# Patient Record
Sex: MALE | Race: WHITE | NOT HISPANIC OR LATINO | Employment: FULL TIME | URBAN - METROPOLITAN AREA
[De-identification: names, ages, dates, MRNs, and addresses within clinical notes are randomized per-mention and may not be internally consistent; named-entity substitution may affect disease eponyms.]

---

## 2017-03-02 ENCOUNTER — ALLSCRIPTS OFFICE VISIT (OUTPATIENT)
Dept: OTHER | Facility: OTHER | Age: 47
End: 2017-03-02

## 2017-03-02 LAB — HBA1C MFR BLD HPLC: 5.1 %

## 2017-06-01 ENCOUNTER — ALLSCRIPTS OFFICE VISIT (OUTPATIENT)
Dept: OTHER | Facility: OTHER | Age: 47
End: 2017-06-01

## 2017-06-01 LAB — HBA1C MFR BLD HPLC: 5.3 %

## 2017-10-03 ENCOUNTER — OFFICE VISIT (OUTPATIENT)
Dept: LAB | Facility: HOSPITAL | Age: 47
End: 2017-10-03
Attending: SPECIALIST
Payer: COMMERCIAL

## 2017-10-03 ENCOUNTER — APPOINTMENT (OUTPATIENT)
Dept: PREADMISSION TESTING | Facility: HOSPITAL | Age: 47
End: 2017-10-03
Payer: COMMERCIAL

## 2017-10-03 ENCOUNTER — APPOINTMENT (OUTPATIENT)
Dept: LAB | Facility: HOSPITAL | Age: 47
End: 2017-10-03
Attending: SPECIALIST
Payer: COMMERCIAL

## 2017-10-03 ENCOUNTER — TRANSCRIBE ORDERS (OUTPATIENT)
Dept: ADMINISTRATIVE | Facility: HOSPITAL | Age: 47
End: 2017-10-03

## 2017-10-03 VITALS — HEIGHT: 76 IN | WEIGHT: 315 LBS | BODY MASS INDEX: 38.36 KG/M2

## 2017-10-03 DIAGNOSIS — Z01.818 PREOP EXAMINATION: Primary | ICD-10-CM

## 2017-10-03 DIAGNOSIS — Z01.818 PREOP EXAMINATION: ICD-10-CM

## 2017-10-03 LAB
ANION GAP SERPL CALCULATED.3IONS-SCNC: 10 MMOL/L (ref 4–13)
BUN SERPL-MCNC: 11 MG/DL (ref 5–25)
CALCIUM SERPL-MCNC: 9.5 MG/DL (ref 8.3–10.1)
CHLORIDE SERPL-SCNC: 104 MMOL/L (ref 100–108)
CO2 SERPL-SCNC: 27 MMOL/L (ref 21–32)
CREAT SERPL-MCNC: 0.93 MG/DL (ref 0.6–1.3)
GFR SERPL CREATININE-BSD FRML MDRD: 99 ML/MIN/1.73SQ M
GLUCOSE P FAST SERPL-MCNC: 114 MG/DL (ref 65–99)
HCT VFR BLD AUTO: 40.7 % (ref 42–52)
HGB BLD-MCNC: 13.9 G/DL (ref 14–18)
POTASSIUM SERPL-SCNC: 4 MMOL/L (ref 3.5–5.3)
SODIUM SERPL-SCNC: 141 MMOL/L (ref 136–145)

## 2017-10-03 PROCEDURE — 80048 BASIC METABOLIC PNL TOTAL CA: CPT

## 2017-10-03 PROCEDURE — 93005 ELECTROCARDIOGRAM TRACING: CPT

## 2017-10-03 PROCEDURE — 85014 HEMATOCRIT: CPT

## 2017-10-03 PROCEDURE — 85018 HEMOGLOBIN: CPT

## 2017-10-03 PROCEDURE — 36415 COLL VENOUS BLD VENIPUNCTURE: CPT

## 2017-10-03 RX ORDER — ROSUVASTATIN CALCIUM 20 MG/1
20 TABLET, COATED ORAL DAILY
COMMUNITY
End: 2018-02-09

## 2017-10-03 RX ORDER — MULTIVIT WITH MINERALS/LUTEIN
1000 TABLET ORAL DAILY
COMMUNITY
End: 2018-02-08

## 2017-10-03 RX ORDER — AMLODIPINE AND VALSARTAN 5; 160 MG/1; MG/1
1 TABLET ORAL DAILY
COMMUNITY
End: 2021-11-22

## 2017-10-03 RX ORDER — OMEGA-3S/DHA/EPA/FISH OIL/D3 300MG-1000
CAPSULE ORAL EVERY MORNING
COMMUNITY
End: 2018-02-08

## 2017-10-03 RX ORDER — NEBIVOLOL 20 MG/1
20 TABLET ORAL DAILY
COMMUNITY
End: 2018-02-09

## 2017-10-03 NOTE — PRE-PROCEDURE INSTRUCTIONS
My Surgical Experience    The following information was developed to assist you to prepare for your operation  What do I need to do before coming to the hospital?   Arrange for a responsible person to drive you to and from the hospital    Arrange care for your children at home  Children are not allowed in the recovery areas of the hospital   Plan to wear clothing that is easy to put on and take off  If you are having shoulder surgery, wear a shirt that buttons or zippers in the front  Bathing  o Shower the evening before and the morning of your surgery with an antibacterial soap  Please refer to the Pre Op Showering Instructions for Surgery Patients Sheet   o Remove nail polish and all body piercing jewelry  o Do not shave any body part for at least 24 hours before surgery-this includes face, arms, legs and upper body  Food  o Nothing to eat or drink after midnight the night before your surgery  This includes candy and chewing gum  o Exception: If your surgery is after 12:00pm (noon), you may have clear liquids such as 7-Up®, ginger ale, apple or cranberry juice, Jell-O®, water, or clear broth until 8:00 am  o Do not drink milk or juice with pulp on the morning before surgery  o Do not drink alcohol 24 hours before surgery  Medicine  o Follow instructions you received from your surgeon about which medicines you may take on the day of surgery  o If instructed to take medicine on the morning of surgery, take pills with just a small sip of water  Call your prescribing doctor for specific infroamtion on what to do if you take insulin    What should I bring to the hospital?    Bring:  Letty Gupta or a walker, if you have them, for foot or knee surgery   A list of the daily medicines, vitamins, minerals, herbals and nutritional supplements you take   Include the dosages of medicines and the time you take them each day   Glasses, dentures or hearing aids   Minimal clothing; you will be wearing hospital sleepwear   Photo ID; required to verify your identity   If you have a Living Will or Power of , bring a copy of the documents   If you have an ostomy, bring an extra pouch and any supplies you use    Do not bring   Medicines or inhalers   Money, valuables or jewelry    What other information should I know about the day of surgery?  Notify your surgeons if you develop a cold, sore throat, cough, fever, rash or any other illness   Report to the Ambulatory Surgical/Same Day Surgery Unit   You will be instructed to stop at Registration only if you have not been pre-registered   Inform your  fi they do not stay that they will be asked by the staff to leave a phone number where they can be reached   Be available to be reached before surgery  In the event the operating room schedule changes, you may be asked to come in earlier or later than expected    *It is important to tell your doctor and others involved in your health care if you are taking or have been taking any non-prescription drugs, vitamins, minerals, herbals or other nutritional supplements  Any of these may interact with some food or medicines and cause a reaction      Pre-Surgery Instructions:   Medication Instructions    amLODIPine-valsartan (EXFORGE) 5-160 MG per tablet Instructed patient per Anesthesia Guidelines   Ascorbic Acid (VITAMIN C) 1000 MG tablet Instructed patient per Anesthesia Guidelines   choline fenofibrate (TRILIPIX) 135 MG capsule Instructed patient per Anesthesia Guidelines   cyanocobalamin 1000 MCG tablet Instructed patient per Anesthesia Guidelines   Multiple Vitamins-Minerals (CENTRUM ADULTS PO) Instructed patient per Anesthesia Guidelines   nebivolol (BYSTOLIC) 20 MG tablet Instructed patient per Anesthesia Guidelines   Omega-3 Fatty Acids (FISH OIL BURP-LESS) 1000 MG CAPS Instructed patient per Anesthesia Guidelines      rosuvastatin (CRESTOR) 20 MG tablet Instructed patient per Anesthesia Guidelines  To take exforge and bystolic a m   Of surgery

## 2017-10-05 LAB
ATRIAL RATE: 62 BPM
P AXIS: 2 DEGREES
PR INTERVAL: 156 MS
QRS AXIS: -2 DEGREES
QRSD INTERVAL: 88 MS
QT INTERVAL: 426 MS
QTC INTERVAL: 432 MS
T WAVE AXIS: 14 DEGREES
VENTRICULAR RATE: 62 BPM

## 2017-10-08 ENCOUNTER — ANESTHESIA EVENT (OUTPATIENT)
Dept: PERIOP | Facility: HOSPITAL | Age: 47
End: 2017-10-08
Payer: COMMERCIAL

## 2017-10-08 NOTE — ANESTHESIA PREPROCEDURE EVALUATION
Review of Systems/Medical History  Patient summary reviewed  Chart reviewed  No history of anesthetic complications     Cardiovascular  Hyperlipidemia, Hypertension on > 1 medication,    Pulmonary       GI/Hepatic            Endo/Other     GYN       Hematology   Musculoskeletal  Obesity ,        Neurology   Psychology           Physical Exam    Airway    Mallampati score: II  TM Distance: <3 FB  Neck ROM: full     Dental       Cardiovascular  Rhythm: regular, Rate: normal,     Pulmonary  Breath sounds clear to auscultation,     Other Findings        Anesthesia Plan  ASA Score- 2       Anesthesia Type- general  Comment: GA with ETT, possible LMA      Induction- intravenous      Informed Consent  Anesthetic plan and risks discussed with patient

## 2017-10-09 ENCOUNTER — ANESTHESIA (OUTPATIENT)
Dept: PERIOP | Facility: HOSPITAL | Age: 47
End: 2017-10-09
Payer: COMMERCIAL

## 2017-10-09 ENCOUNTER — HOSPITAL ENCOUNTER (OUTPATIENT)
Facility: HOSPITAL | Age: 47
Setting detail: OUTPATIENT SURGERY
Discharge: HOME | End: 2017-10-09
Attending: SPECIALIST | Admitting: SPECIALIST
Payer: COMMERCIAL

## 2017-10-09 VITALS
RESPIRATION RATE: 18 BRPM | SYSTOLIC BLOOD PRESSURE: 148 MMHG | TEMPERATURE: 98.2 F | HEART RATE: 68 BPM | DIASTOLIC BLOOD PRESSURE: 74 MMHG | OXYGEN SATURATION: 95 %

## 2017-10-09 PROBLEM — K43.9 VENTRAL HERNIA WITHOUT OBSTRUCTION OR GANGRENE: Status: ACTIVE | Noted: 2017-10-09

## 2017-10-09 PROBLEM — E66.01 MORBID OBESITY DUE TO EXCESS CALORIES (HCC): Status: ACTIVE | Noted: 2017-10-09

## 2017-10-09 PROCEDURE — C1781 MESH (IMPLANTABLE): HCPCS | Performed by: SPECIALIST

## 2017-10-09 DEVICE — VENTRALEX HERNIA PATCH, 6.4 CM (2.5"), MEDIUM CIRCLE WITH STRAP
Type: IMPLANTABLE DEVICE | Site: ABDOMEN | Status: FUNCTIONAL
Brand: VENTRALEX

## 2017-10-09 RX ORDER — ONDANSETRON 2 MG/ML
4 INJECTION INTRAMUSCULAR; INTRAVENOUS ONCE AS NEEDED
Status: DISCONTINUED | OUTPATIENT
Start: 2017-10-09 | End: 2017-10-09 | Stop reason: HOSPADM

## 2017-10-09 RX ORDER — ONDANSETRON 2 MG/ML
INJECTION INTRAMUSCULAR; INTRAVENOUS AS NEEDED
Status: DISCONTINUED | OUTPATIENT
Start: 2017-10-09 | End: 2017-10-09 | Stop reason: SURG

## 2017-10-09 RX ORDER — OXYCODONE HYDROCHLORIDE AND ACETAMINOPHEN 5; 325 MG/1; MG/1
1 TABLET ORAL EVERY 4 HOURS PRN
Qty: 20 TABLET | Refills: 0 | Status: SHIPPED | OUTPATIENT
Start: 2017-10-09 | End: 2017-10-14

## 2017-10-09 RX ORDER — LIDOCAINE HYDROCHLORIDE 10 MG/ML
INJECTION, SOLUTION INFILTRATION; PERINEURAL AS NEEDED
Status: DISCONTINUED | OUTPATIENT
Start: 2017-10-09 | End: 2017-10-09 | Stop reason: SURG

## 2017-10-09 RX ORDER — SODIUM CHLORIDE, SODIUM LACTATE, POTASSIUM CHLORIDE, CALCIUM CHLORIDE 600; 310; 30; 20 MG/100ML; MG/100ML; MG/100ML; MG/100ML
75 INJECTION, SOLUTION INTRAVENOUS CONTINUOUS
Status: DISCONTINUED | OUTPATIENT
Start: 2017-10-09 | End: 2017-10-09 | Stop reason: HOSPADM

## 2017-10-09 RX ORDER — HYDROMORPHONE HCL 110MG/55ML
0.5 PATIENT CONTROLLED ANALGESIA SYRINGE INTRAVENOUS AS NEEDED
Status: DISCONTINUED | OUTPATIENT
Start: 2017-10-09 | End: 2017-10-09 | Stop reason: HOSPADM

## 2017-10-09 RX ORDER — MIDAZOLAM HYDROCHLORIDE 1 MG/ML
INJECTION INTRAMUSCULAR; INTRAVENOUS AS NEEDED
Status: DISCONTINUED | OUTPATIENT
Start: 2017-10-09 | End: 2017-10-09 | Stop reason: SURG

## 2017-10-09 RX ORDER — PROPOFOL 10 MG/ML
INJECTION, EMULSION INTRAVENOUS AS NEEDED
Status: DISCONTINUED | OUTPATIENT
Start: 2017-10-09 | End: 2017-10-09 | Stop reason: SURG

## 2017-10-09 RX ORDER — BUPIVACAINE HYDROCHLORIDE AND EPINEPHRINE 5; 5 MG/ML; UG/ML
INJECTION, SOLUTION PERINEURAL AS NEEDED
Status: DISCONTINUED | OUTPATIENT
Start: 2017-10-09 | End: 2017-10-09 | Stop reason: HOSPADM

## 2017-10-09 RX ORDER — FENTANYL CITRATE/PF 50 MCG/ML
50 SYRINGE (ML) INJECTION AS NEEDED
Status: DISCONTINUED | OUTPATIENT
Start: 2017-10-09 | End: 2017-10-09 | Stop reason: HOSPADM

## 2017-10-09 RX ORDER — SUCCINYLCHOLINE CHLORIDE 20 MG/ML
INJECTION INTRAMUSCULAR; INTRAVENOUS AS NEEDED
Status: DISCONTINUED | OUTPATIENT
Start: 2017-10-09 | End: 2017-10-09 | Stop reason: SURG

## 2017-10-09 RX ORDER — MAGNESIUM HYDROXIDE 1200 MG/15ML
LIQUID ORAL AS NEEDED
Status: DISCONTINUED | OUTPATIENT
Start: 2017-10-09 | End: 2017-10-09 | Stop reason: HOSPADM

## 2017-10-09 RX ORDER — MEPERIDINE HYDROCHLORIDE 25 MG/ML
12.5 INJECTION INTRAMUSCULAR; INTRAVENOUS; SUBCUTANEOUS
Status: DISCONTINUED | OUTPATIENT
Start: 2017-10-09 | End: 2017-10-09 | Stop reason: HOSPADM

## 2017-10-09 RX ORDER — FENTANYL CITRATE 50 UG/ML
INJECTION, SOLUTION INTRAMUSCULAR; INTRAVENOUS AS NEEDED
Status: DISCONTINUED | OUTPATIENT
Start: 2017-10-09 | End: 2017-10-09 | Stop reason: SURG

## 2017-10-09 RX ORDER — PROMETHAZINE HYDROCHLORIDE 25 MG/ML
12.5 INJECTION, SOLUTION INTRAMUSCULAR; INTRAVENOUS ONCE AS NEEDED
Status: DISCONTINUED | OUTPATIENT
Start: 2017-10-09 | End: 2017-10-09 | Stop reason: HOSPADM

## 2017-10-09 RX ADMIN — FENTANYL CITRATE 100 MCG: 50 INJECTION, SOLUTION INTRAMUSCULAR; INTRAVENOUS at 11:39

## 2017-10-09 RX ADMIN — ONDANSETRON 4 MG: 2 INJECTION INTRAMUSCULAR; INTRAVENOUS at 12:13

## 2017-10-09 RX ADMIN — LIDOCAINE HYDROCHLORIDE 50 MG: 10 INJECTION, SOLUTION INFILTRATION; PERINEURAL at 11:42

## 2017-10-09 RX ADMIN — MIDAZOLAM HYDROCHLORIDE 2 MG: 1 INJECTION, SOLUTION INTRAMUSCULAR; INTRAVENOUS at 11:39

## 2017-10-09 RX ADMIN — PROPOFOL 100 MG: 10 INJECTION, EMULSION INTRAVENOUS at 11:46

## 2017-10-09 RX ADMIN — PROPOFOL 200 MG: 10 INJECTION, EMULSION INTRAVENOUS at 11:42

## 2017-10-09 RX ADMIN — SUCCINYLCHOLINE CHLORIDE 100 MG: 20 INJECTION, SOLUTION INTRAMUSCULAR; INTRAVENOUS at 11:47

## 2017-10-09 RX ADMIN — CEFAZOLIN SODIUM 2000 MG: 2 SOLUTION INTRAVENOUS at 11:58

## 2017-10-09 RX ADMIN — DEXAMETHASONE SODIUM PHOSPHATE 4 MG: 10 INJECTION INTRAMUSCULAR; INTRAVENOUS at 12:13

## 2017-10-09 RX ADMIN — SODIUM CHLORIDE, SODIUM LACTATE, POTASSIUM CHLORIDE, AND CALCIUM CHLORIDE 75 ML/HR: .6; .31; .03; .02 INJECTION, SOLUTION INTRAVENOUS at 08:14

## 2017-10-09 RX ADMIN — PROPOFOL 100 MG: 10 INJECTION, EMULSION INTRAVENOUS at 11:45

## 2017-10-09 NOTE — ANESTHESIA POSTPROCEDURE EVALUATION
Post-Op Assessment Note      CV Status:  Stable    Mental Status:  Awake    Hydration Status:  Stable    PONV Controlled:  None    Airway Patency:  Patent    Post Op Vitals Reviewed: Yes          Staff: Anesthesiologist           /63 (10/09/17 1243)    Temp      Pulse 82 (10/09/17 1243)   Resp      SpO2 98 % (10/09/17 1243)

## 2017-10-09 NOTE — DISCHARGE INSTRUCTIONS
Ventral Hernia   WHAT YOU NEED TO KNOW:   A ventral hernia is a bulge through the umblicus in your abdomen  A hernia is usually caused by weakness in the tissues and muscles of your abdomen  The bulge is usually caused by a part of your intestine, but it may also be tissue or fat pushing through the weakness  DISCHARGE INSTRUCTIONS:   Call 911 for any of the following:   · You have sudden trouble breathing  Seek care immediately if:   · You have severe pain  · You have bloody bowel movements  · You stop having bowel movements or passing gas  · Your abdomen is suddenly very hard  Contact your healthcare provider if:   · You have a fever  · You have nausea and are vomiting  · You are constipated  · Your hernia has returned  · You have a lump, or collection of fluid, under your skin  · You have pain that does not go away, even after you take pain medicine  · You have questions or concerns about your condition or care  Medicines: You may need any of the following:  · NSAIDs , such as ibuprofen, help decrease swelling, pain, and fever  This medicine is available with or without a doctor's order  NSAIDs can cause stomach bleeding or kidney problems in certain people  If you take blood thinner medicine, always ask your healthcare provider if NSAIDs are safe for you  Always read the medicine label and follow directions  · Prescription pain medicine  may be given  Ask your how to take this medicine safely  · Take your medicine as directed  Contact your healthcare provider if you think your medicine is not helping or if you have side effects  Tell him or her if you are allergic to any medicine  Keep a list of the medicines, vitamins, and herbs you take  Include the amounts, and when and why you take them  Bring the list or the pill bottles to follow-up visits  Carry your medicine list with you in case of an emergency  Prevent another incisional hernia:   · Maintain a healthy weight  Ask your healthcare provider how much you should weigh  Ask him to help you create a weight loss plan if you are overweight  Ask your healthcare provider what types of food you should eat  · Do not strain  when you have a bowel movement  Take an over-the-counter bowel movement softener and drink plenty of water  When you cough, hold a pillow against your incision to prevent strain  · Do not smoke  If you smoke, it is never too late to quit  Ask for information if you need help quitting  · Wear your support device as directed  You may need to wear a support device, such as an abdominal binder for up to 2 weeks after surgery  This helps decrease pain and the risk of fluid collecting under your skin  · Return to your usual activities as directed  Do not lift more than 10 pounds or do strenuous activity for up to 6 weeks  Follow up with your healthcare provider as directed:  Write down your questions so you remember to ask them during your visits  © 2017 [facility] Information is for End User's use only and may not be sold, redistributed or otherwise used for commercial purposes  All illustrations and images included in CareNotes® are the copyrighted property of [employer] M [employer]  or [employer]  The above information is an  only  It is not intended as medical advice for individual conditions or treatments  Talk to your doctor, nurse or pharmacist before following any medical regimen to see if it is safe and effective for you  SAME DAY SURGERY POST-OPERATIVE INSTRUCTIONS    Please follow carefully all instructions checked below  Ricky Garza  Pre-op Diagnosis:   Ventral hernia without obstruction or gangrene [K43 9]  Post-op Diagnosis:  * No post-op diagnosis entered *   Post-Op Diagnosis Codes: * Ventral hernia without obstruction or gangrene [K43 9]  Procedure(s):  REPAIR HERNIA VENTRAL WITH MESH  Surgeon(s):  Karne Lora MD    1   Diet:  · Begin with liquids and light food (Jell-O, soup, etc) Progress slowly to your normal diet  No alcoholic beverages for 24 hours  · Resume your normal diet  2  Medications:  · Home medications reviewed  Resume pre-operative medications unless noted below  · Prescription sent home with patient and Prescription sent over to pharmacy  · See after visit summary for reconciled discharge medications provided to patient and family  3  Activities:  · Do NOT make important personal or business decisions for 24 hours  · Do NOT drive or operate machinery for 24 hours  · While taking pain medication, do not drive and be careful as you walk or climb stairs  · Limit your activities for 24 hours  Do not engage in sports, heavy work or heavy  lifting until your physician gives you permission  4  Wound Care:  · Change dressings as necessary after 2 days  · Keep dressings dry  · No sex, douching, tampons  5  Special Instructions:  · Elevate operative site for the next 12-24 hours  · Apply ice to operative site as directed  · Full weight bearing  6  Bathing:  · May shower after 2 days  7  When to Call:       Call if fever, Nausea, vomiting, Constipation not feeling well, or wound infection,      bleeding,reddness and excessive pain,  8  Follow-up Care:  · Make an appointment to see MD Pamela Kennedy   IL[facility]  in 10 days for Follow up   Please Call Office  [phone] for appointment,   · Call if fever, Nausea, vomiting, Constipation not feeling well, or wound infection, bleeding,reddness and excessive pain,    Vanessa Resendez MD [facility] FACS  10/09/17  12:49 PM

## 2017-10-09 NOTE — OP NOTE
General SurgeryREPAIR HERNIA VENTRAL WITH MESH Op Note    Priscella Lo  10/9/2017    Pre-op Diagnosis:   Ventral hernia without obstruction or gangrene [K43 9]  Patient Active Problem List   Diagnosis    Ventral hernia without obstruction or gangrene    Morbid obesity due to excess calories ([facility] [address] )     Post-op Diagnosis:    Post-Op Diagnosis Codes: * Ventral hernia without obstruction or gangrene [K43 9]  Past Medical History:   Diagnosis Date    Hyperlipidemia     Hypertension     Obesity     PONV (postoperative nausea and vomiting)     this was with a brother       Procedure(s):  REPAIR HERNIA VENTRAL WITH MESH    Surgeon(s):  Venkatesh Pacheco MD    Anesthesia:  General    Assistant:  [address] of Nurse was utilized as a first assistant as there is no surgical residency program at [facility]    Staff:   Circulator: Thea Aguiar RN  Scrub Person: Tiffanie Keane RN; Sharon Peacock RN    Operative Findings:  2cm defect omentum content of hernia    OPERATIVE TECHNIQUE    Priscella Lo was identified by me  Proper detailed consent was obtained from patient risk and benefits were explained to patient and family in detail prior to coming to Operating Room  Site was marked  Priscella Lo was given pre-operative antibiotics  There is no height or weight on file to calculate BMI  Priscella Lo is ASA 3 and Fire risk- 3  Priscella Lo was re-identified in the OR  Site was identified and detailed timeout was performed with Anesthesia and OR staff  A Curvilinear transverse incision was made the Below the umbilicus  skin And subcutaneous tissue was cut along the line of incision Hernia under the Umbilicus was  from the umbilicus and sac was carefully dissected and opened Content of hernia was the omentum  The omentum was pushed back into the abdominal cavity  No other defect was identified      [place] ventral ex hernia patch medium size  reference [id number] number  lot number [id number]  and expiry date 06/2018  This mesh was laid the flat the over the omentum and the was attached to the anterior abdominal wall with 4 anchoring stitches at 12:00 6:00 to 3:00 and 9:00 position with Prolene 2-0 The mesh and the area was thoroughly lavaged her for irrigation mixed with antibiotics  Defect was closed with Prolene 2-0 inutrepted sutures,    Umbilicus was reattached to the anterior abdominal wall with the Chromic catgut 2-0 suture  Subcutaneous tissue was approximated with the Chromic catgut 2-0 sutures  The skin was approximated With Monocryl 40  All the layers were thoroughly injected with the Marcaine with epinephrine total 30 cc was used  [facility] Salaaelkin TaborMichel tolerated the procedure well, remain stable during and after the procedure  At the end of the procedure No active bleeding was noted and all the instruments, needle and sponge counts were noted to be correct  Patient was transfered to recovery area in stable condition  I was present for the entire procedure No qualified resident was available  A physician's assistant was required due to the intensity of the surgery  This no residency program in this hospital no resident was available to assist  Physician assistant was required for camera operation, hemostasis retraction and the closure of the surgical wound  Physician assistant was present during the entire procedure    Estimated Blood Loss:  Minimal    Specimens:      Drains:   nil    Complications:  None    Total OR Time  1 Hr 4 Min 54 Sec   or    I was present for the entire procedure    Patient Disposition:  PACU       Roddy Morales MD MS Cathy Hyde    Date: 10/9/2017  Time: 12:41 PM    Copy to PCP: Julianne Stauffer MD

## 2017-11-07 ENCOUNTER — ALLSCRIPTS OFFICE VISIT (OUTPATIENT)
Dept: OTHER | Facility: OTHER | Age: 47
End: 2017-11-07

## 2017-11-07 ENCOUNTER — GENERIC CONVERSION - ENCOUNTER (OUTPATIENT)
Dept: OTHER | Facility: OTHER | Age: 47
End: 2017-11-07

## 2017-11-07 LAB
BILIRUB UR QL STRIP: NORMAL
CLARITY UR: NORMAL
COLOR UR: YELLOW
GLUCOSE (HISTORICAL): NORMAL
HGB UR QL STRIP.AUTO: NORMAL
KETONES UR STRIP-MCNC: NORMAL MG/DL
LEUKOCYTE ESTERASE UR QL STRIP: NORMAL
NITRITE UR QL STRIP: NORMAL
OCCULT BLD, FECAL IMMUNOLOGICAL (HISTORICAL): NEGATIVE
PH UR STRIP.AUTO: 6 [PH]
PROT UR STRIP-MCNC: NORMAL MG/DL
SP GR UR STRIP.AUTO: 1.02
UROBILINOGEN UR QL STRIP.AUTO: NORMAL

## 2017-11-08 ENCOUNTER — GENERIC CONVERSION - ENCOUNTER (OUTPATIENT)
Dept: OTHER | Facility: OTHER | Age: 47
End: 2017-11-08

## 2017-11-08 LAB
A/G RATIO (HISTORICAL): 1.6 (ref 1.2–2.2)
ALBUMIN SERPL BCP-MCNC: 4.5 G/DL (ref 3.5–5.5)
ALP SERPL-CCNC: 59 IU/L (ref 39–117)
ALT SERPL W P-5'-P-CCNC: 39 IU/L (ref 0–44)
AST SERPL W P-5'-P-CCNC: 51 IU/L (ref 0–40)
BILIRUB SERPL-MCNC: 0.3 MG/DL (ref 0–1.2)
BUN SERPL-MCNC: 10 MG/DL (ref 6–24)
BUN/CREA RATIO (HISTORICAL): 12 (ref 9–20)
CALCIUM SERPL-MCNC: 9.3 MG/DL (ref 8.7–10.2)
CHLORIDE SERPL-SCNC: 102 MMOL/L (ref 96–106)
CHOLEST SERPL-MCNC: 180 MG/DL (ref 100–199)
CHOLEST/HDLC SERPL: 6.7 RATIO UNITS (ref 0–5)
CO2 SERPL-SCNC: 22 MMOL/L (ref 18–29)
CREAT SERPL-MCNC: 0.81 MG/DL (ref 0.76–1.27)
CREATININE, URINE (HISTORICAL): 93.1 MG/DL
DEPRECATED RDW RBC AUTO: 14.2 % (ref 12.3–15.4)
EGFR AFRICAN AMERICAN (HISTORICAL): 124 ML/MIN/1.73
EGFR-AMERICAN CALC (HISTORICAL): 107 ML/MIN/1.73
ERYTHROCYTE SEDIMENTATION RATE (HISTORICAL): 12 MM/HR (ref 0–15)
GLUCOSE SERPL-MCNC: 107 MG/DL (ref 65–99)
HBA1C MFR BLD HPLC: 5.4 % (ref 4.8–5.6)
HCT VFR BLD AUTO: 40.7 % (ref 37.5–51)
HDLC SERPL-MCNC: 27 MG/DL
HGB BLD-MCNC: 14 G/DL (ref 12.6–17.7)
LDLC SERPL CALC-MCNC: ABNORMAL MG/DL (ref 0–99)
MCH RBC QN AUTO: 31.3 PG (ref 26.6–33)
MCHC RBC AUTO-ENTMCNC: 34.4 G/DL (ref 31.5–35.7)
MCV RBC AUTO: 91 FL (ref 79–97)
MICROALBUM.,U,RANDOM (HISTORICAL): 4.1 UG/ML
MICROALBUMIN/CREATININE RATIO (HISTORICAL): 4.4 MG/G CREAT (ref 0–30)
PLATELET # BLD AUTO: 144 X10E3/UL (ref 150–379)
POTASSIUM SERPL-SCNC: 4.4 MMOL/L (ref 3.5–5.2)
PROSTATE SPECIFIC ANTIGEN (HISTORICAL): 0.3 NG/ML (ref 0–4)
RBC (HISTORICAL): 4.48 X10E6/UL (ref 4.14–5.8)
SODIUM SERPL-SCNC: 141 MMOL/L (ref 134–144)
TOT. GLOBULIN, SERUM (HISTORICAL): 2.9 G/DL (ref 1.5–4.5)
TOTAL PROTEIN (HISTORICAL): 7.4 G/DL (ref 6–8.5)
TRIGL SERPL-MCNC: 560 MG/DL (ref 0–149)
TSH SERPL DL<=0.05 MIU/L-ACNC: 2.04 UIU/ML (ref 0.45–4.5)
URIC ACID (HISTORICAL): 7.1 MG/DL (ref 3.7–8.6)
VLDLC SERPL CALC-MCNC: ABNORMAL MG/DL (ref 5–40)
WBC # BLD AUTO: 5.7 X10E3/UL (ref 3.4–10.8)

## 2017-11-09 LAB
C-REACT.PROTEIN,QUANT (HISTORICAL): 2 MG/L (ref 0–4.9)
WRITTEN AUTHORIZATION (HISTORICAL): NORMAL

## 2018-01-11 NOTE — RESULT NOTES
Message   PLEASE CALL   XRAY SHOWS SOME VERY MILD ARTHRITIS   GIVE ME A CALL NEXT WEEK AND LET ME KNOW HOW THE MEDICATION HELPS   THANKS     Verified Results  * XR KNEE 4+ VIEW LEFT [id number] 12:00AM Tr Farias     Test Name Result Flag Reference   XR KNEE 4+ VW LEFT (Report)     LEFT KNEE     INDICATION: Left knee pain  MCL sprain     COMPARISON: None     VIEWS: 4; 4 images     FINDINGS:     There is no acute fracture or dislocation  There is no joint effusion  Superior patellar spurring  No lytic or blastic lesions are seen  Soft tissues are unremarkable  IMPRESSION:     No acute osseous abnormality         Workstation performed: [id number]     Signed by:   Alexsandra Francisco MD   5/6/16

## 2018-01-13 VITALS
BODY MASS INDEX: 40.43 KG/M2 | SYSTOLIC BLOOD PRESSURE: 134 MMHG | RESPIRATION RATE: 20 BRPM | WEIGHT: 315 LBS | OXYGEN SATURATION: 97 % | HEIGHT: 74 IN | HEART RATE: 66 BPM | DIASTOLIC BLOOD PRESSURE: 80 MMHG | TEMPERATURE: 98.1 F

## 2018-01-13 VITALS
HEIGHT: 74 IN | HEART RATE: 72 BPM | RESPIRATION RATE: 20 BRPM | BODY MASS INDEX: 40.43 KG/M2 | TEMPERATURE: 97.7 F | WEIGHT: 315 LBS | OXYGEN SATURATION: 96 % | DIASTOLIC BLOOD PRESSURE: 80 MMHG | SYSTOLIC BLOOD PRESSURE: 154 MMHG

## 2018-01-13 VITALS
SYSTOLIC BLOOD PRESSURE: 144 MMHG | DIASTOLIC BLOOD PRESSURE: 76 MMHG | WEIGHT: 315 LBS | RESPIRATION RATE: 20 BRPM | BODY MASS INDEX: 40.43 KG/M2 | HEIGHT: 74 IN | TEMPERATURE: 97.3 F | HEART RATE: 60 BPM

## 2018-01-13 NOTE — RESULT NOTES
Discussion/Summary   JAS,      REVIEWED BW RESULTS   ALL LOOKED PRETTY STABLE - CONSISTENT WITH PREVIOUS LAB TESTS   KEEP UP THE GOOD WORK!!      DR CAMPBELL     Verified Results  (1) CBC/ PLT (NO DIFF) [id number] 12:00AM Book[employer]     Test Name Result Flag Reference   WBC 5 7 x10E3/uL  3 4-10 8   RBC 4 48 x10E6/uL  4 14-5 80   Hemoglobin 14 0 g/dL  12 6-17 7   Hematocrit 40 7 %  37 5-51 0   MCV 91 fL  79-97   MCH 31 3 pg  26 6-33 0   MCHC 34 4 g/dL  31 5-35 7   RDW 14 2 %  12 3-15 4   Platelets 777 [id number]/[id number] L 150-379     (1) COMPREHENSIVE METABOLIC PANEL [id number] 08:26HU BookBag     Test Name Result Flag Reference   Glucose, Serum 107 mg/dL H 65-99   BUN 10 mg/dL  6-24   Creatinine, Serum 0 81 mg/dL  0 76-1 27   BUN/Creatinine Ratio 12  9-20   Sodium, Serum 141 mmol/L  134-144   Potassium, Serum 4 4 mmol/L  3 5-5 2   Chloride, Serum 102 mmol/L     Carbon Dioxide, Total 22 mmol/L  18-29   Calcium, Serum 9 3 mg/dL  8 7-10 2   Protein, Total, Serum 7 4 g/dL  6 0-8 5   Albumin, Serum 4 5 g/dL  3 5-5 5   Globulin, Total 2 9 g/dL  1 5-4 5   A/G Ratio 1 6  1 2-2 2   Bilirubin, Total 0 3 mg/dL  0 0-1 2   Alkaline Phosphatase, S 59 IU/L     AST (SGOT) 51 IU/L H 0-40   ALT (SGPT) 39 IU/L  0-44   eGFR If NonAfricn Am 107 mL/min/1 73  >59   eGFR If Africn Am 124 mL/min/1 73  >59     (1) HEMOGLOBIN A1C [id number] 12:00AM BookBag     Test Name Result Flag Reference   Hemoglobin A1c 5 4 %  4 8-5 6   Pre-diabetes: 5 7 - 6 4           Diabetes: >6 4           Glycemic control for adults with diabetes: <7 0     (1) LIPID PANEL, FASTING [id number] 12:00AM BookBag     Test Name Result Flag Reference   Cholesterol, Total 180 mg/dL  100-199   Triglycerides 560 mg/dL HH 0-149   HDL Cholesterol 27 mg/dL L >39   VLDL Cholesterol [place]   5-40   The calculation for the VLDL cholesterol is not valid when  triglyceride level is >400 mg/dL   mg/dL   The calculation for the VLDL cholesterol is not valid when  triglyceride level is >400 mg/dL  LDL Cholesterol Calc   0-99   Triglyceride result indicated is too high for an accurate LDL  cholesterol estimation  mg/dL   Triglyceride result indicated is too high for an accurate LDL  cholesterol estimation  T  Chol/HDL Ratio 6 7 ratio units H 0 0-5 0   T  Chol/HDL Ratio                                                             Men  Women                                               1/2 Avg  Risk  3 4    3 3                                                   Avg Risk  5 0    4 4                                                2X Avg  Risk  9 6    7 1                                                3X Avg  Risk 23 4   11 0     (1) TSH [id number] 12:00AM Kathy Rey     Test Name Result Flag Reference   TSH 2 040 uIU/mL  0 450-4 500     (1) URIC ACID [id number] 12:00AM Kathy Rey     Test Name Result Flag Reference   Uric Acid, Serum 7 1 mg/dL  3 7-8 6   Therapeutic target for gout patients: <6 0     (1) PSA (SCREEN) (Dx V76 44 Screen for Prostate Cancer) [id number] 12:00AM Kathy Rey     Test Name Result Flag Reference   Prostate Specific Ag, Serum 0 3 ng/mL  0 0-4 0   Roche ECLIA methodology  According to the American Urological Association, Serum PSA should  decrease and remain at undetectable levels after radical  prostatectomy  The AUA defines biochemical recurrence as an initial  PSA value 0 2 ng/mL or greater followed by a subsequent confirmatory  PSA value 0 2 ng/mL or greater  Values obtained with different assay methods or kits cannot be used  interchangeably  Results cannot be interpreted as absolute evidence  of the presence or absence of malignant disease       [facility]) Sedimentation Rate-Westergren [id number] 12:00AM Kathy Rey     Test Name Result Flag Reference   Sedimentation Rate-Westergren 12 mm/hr  0-15

## 2018-01-13 NOTE — PROGRESS NOTES
Assessment    1  Encounter for preventive health examination (V70 0) (Z00 00)   2  Benign essential hypertension (401 1) (I10)   3  Elevated blood sugar (790 29) (R73 9)   4  Hyperlipidemia (272 4) (E78 5)    Plan  Benign essential hypertension    · Follow-up visit in 6 months Evaluation and Treatment  Follow-up  Status: Hold For -  Scheduling  Requested for: 92KAK3821   · A diet low in sodium and high in potassium, magnesium, and calcium can help your  blood pressure ; Status:Complete;   Done: 66NMI3706   · Continue with our present treatment plan ; Status:Complete;   Done: 50JPT2121   · Restrict the salt in your diet by avoiding highly salted foods ; Status:Complete;   Done:  16NON3222   · Call [phone] if: You become dizzy or lightheaded, especially when you stand up  after sitting for a while ; Status:Complete;   Done: 38WQW5047  Benign essential hypertension, Elevated blood sugar, Health Maintenance, Encounter for  prostate cancer screening, Hyperlipidemia    · (1) PSA (SCREEN) (Dx V76 44 Screen for Prostate Cancer); Status: In Progress -  Specimen/Data Collected;   Done: 40ZFX7893  Benign essential hypertension, Elevated blood sugar, Health Maintenance,  Hyperlipidemia    · (1) CBC/ PLT (NO DIFF); Status: In Progress - Specimen/Data Collected;   Done:  76NVR6045   · (1) COMPREHENSIVE METABOLIC PANEL; Status: In Progress - Specimen/Data  Collected;   Done: 44GGQ4544   · (1) C-REACTIVE PROTEIN; Status: In Progress - Specimen/Data Collected;   Done:  32JSP8166   · (1) HEMOGLOBIN A1C; Status: In Progress - Specimen/Data Collected;   Done:  11USU4084   · (1) LIPID PANEL, FASTING; Status: In Progress - Specimen/Data Collected;   Done:  10QZP3839   · (1) MICROALBUMIN CREATININE RATIO, RANDOM URINE; Status: In Progress -  Specimen/Data Collected;   Done: 27YPO5820   · (1) SED RATE; Status: In Progress - Specimen/Data Collected;   Done: 22RVG9003   · (1) TSH; Status: In Progress - Specimen/Data Collected;   Done: 26CPO1781   · (1) URIC ACID; Status: In Progress - Specimen/Data Collected;   Done: 92BON0096   · EKG/ECG- POC; Status:Complete;   Done: 84XXL6102 10:56AM   · Hemoccult Screening - POC; Status:Complete;   Done: 38MAY9076 10:55AM   · Routine Venipuncture - POC; Status:Complete;   Done: 85YZW6509   · Urine Dip Automated- POC; Status:Complete;   Done: 37ICQ4919 10:54AM  Elevated blood sugar    · *VB - Eye Exam; Status:Active; Requested for:07Nov2017;    · *VB - Foot Exam; Status:Complete;   Done: 79AZG7015 10:59AM  Flu vaccine need    · Fluzone Quadrivalent Intramuscular Suspension  Health Maintenance    · Follow-up visit in 1 year Evaluation and Treatment  Follow-up  Status: Hold For -  Scheduling  Requested for: 01CDI7908   · Always use a seat belt and shoulder strap when riding or driving a motor vehicle ;  Status:Complete;   Done: 47SNW5143   · Begin a limited exercise program ; Status:Complete;   Done: 80ERH6522   · Drink plenty of fluids ; Status:Complete;   Done: 34WHO1371   · Eat a low fat and low cholesterol diet ; Status:Complete;   Done: 17EJG7188   · Eat a normal well-balanced diet ; Status:Complete;   Done: 38RPF1754   · Use a sun block product with an SPF of 15 or more ; Status:Complete;   Done:  48ZSD8641   · We encourage all of our patients to exercise regularly  30 minutes of exercise or physical  activity five or more days a week is recommended for children and adults ;  Status:Complete;   Done: 96KNZ0243   · We recommend routine visits to a dentist ; Status:Complete;   Done: 10CAJ6344   · We recommend that you bring your body mass index down to 26 ; Status:Complete;    Done: 16RKR9537    Discussion/Summary  Impression: health maintenance visit  Currently, he eats a healthy diet  Prostate cancer screening: prostate cancer screening is current and PSA was ordered  Testicular cancer screening: testicular cancer screening is current   Colorectal cancer screening: colorectal cancer screening is current and fecal occult blood testing is needed every year  Advice and education were given regarding cardiovascular risk reduction, sunscreen use and helmet use  DISCUSSED HEALTH MAINTENANCE ISSUES  BW WILL BE OBTAINED  RV 1 YR FOR CPX    PLENTY OF FLUIDS  MONITOR AND DECREASE DIETARY SODIUM INTAKE  INCREASE ACTIVITY  MEDICATION AS DIRECTED  RV 6 MONTHS, SOONER PRN  The treatment plan was reviewed with the patient/guardian  The patient/guardian understands and agrees with the treatment plan      Chief Complaint  Patient is here today for his annual physical exam and blood pressure check, L  Sanchez/LPN      History of Present Illness  HM, Adult Male: The patient is being seen for a health maintenance evaluation  General Health: The patient's health since the last visit is described as good  He has regular dental visits  He denies vision problems  He denies hearing loss  Immunizations status: not up to date  Lifestyle:  He consumes a diverse and healthy diet  He does not have any weight concerns  He does not exercise regularly  He does not use tobacco  He consumes alcohol  Screening: cancer screening reviewed and current  metabolic screening reviewed and current  risk screening reviewed and current  HPI: [address] [place] [address] Rd RECORD  NO CONCERNS      Review of Systems    Constitutional: no fever, no chills and not feeling tired  Eyes: no eyesight problems  ENT: no sore throat and no nasal discharge  Cardiovascular: no chest pain, the heart rate was not fast, no palpitations and no extremity edema  Respiratory: no shortness of breath, no cough, no wheezing and no shortness of breath during exertion  Gastrointestinal: no nausea, no vomiting, no constipation and no diarrhea  Genitourinary: no incontinence  Musculoskeletal: no arthralgias, no myalgias and no joint stiffness  Integumentary: no rashes  Neurological: no headache, no numbness, no tingling and no dizziness  Psychiatric: no anxiety and no depression  Endocrine: no muscle weakness  Hematologic/Lymphatic: no swollen glands  ROS reviewed  Active Problems    1  Benign essential hypertension (401 1) (I10)   2  Elevated blood sugar (790 29) (R73 9)   3  Hyperlipidemia (272 4) (E78 5)   4  Peripheral edema (782 3) (R60 9)   5  Umbilical hernia without obstruction and without gangrene (553 1) (K42 9)    Past Medical History    · History of hemorrhoids (V13 89) (Z87 19)   · Hyperlipidemia (272 4) (E78 5)   · History of Sprain of medial collateral ligament of left knee, initial encounter (844 1)  (R03 137F)    Surgical History    · History of Surgery Vas Deferens Vasectomy    Family History  Mother    · Family history of cardiac disorder (V17 49) (Z82 49)   · Family history of congestive heart failure (V17 49) (Z82 49)   · Family history of melanoma (V16 8) (Z80 8)  Father    · Family history of cardiac disorder (V17 49) (Z82 49)  Brother    · History of heart artery stent  Family History    · Denied: Family history of mental disorder    Social History    · Alcohol ingestion of one to four drinks per day (V69 8) (Z78 9)   · Alcohol use   · Patient drinks 12 beers in one day   · Daily alcohol use   · Dental care, regularly   · Never smoked   · No caffeine use    Current Meds   1  Bystolic 20 MG Oral Tablet; TAKE 1 TABLET DAILY; Therapy: (Recorded:12Sep2014) to Recorded   2  Centrum Oral Tablet; TAKE 1 TABLET DAILY; Therapy: (Recorded:12Sep2014) to Recorded   3  Crestor 20 MG Oral Tablet; TAKE 1 TABLET DAILY; Therapy: (Recorded:12Sep2014) to Recorded   4  Exforge 5-160 MG Oral Tablet; TAKE 1 TABLET DAILY; Therapy: (Recorded:12Sep2014) to Recorded   5  Fish Oil 1200 MG Oral Capsule; TAKE 2 CAPSULE Daily; Therapy: (Recorded:24Mar2015) to Recorded   6  Trilipix 135 MG Oral Capsule Delayed Release; TAKE 1 CAPSULE DAILY; Therapy: (Recorded:12Sep2014) to Recorded   7   Vitamin C 500 MG Oral Tablet; take 2 tablet daily; Therapy: (Recorded:50Deq2728) to Recorded    Allergies    1  No Known Drug Allergies    Vitals   Recorded: 97PEY1604 10:29AM   Temperature 97 7 F, Temporal   Heart Rate 72, R Radial   Pulse Quality Normal, R Radial   Respiration Quality Normal   Respiration 20   Systolic 176, LUE, Sitting   Diastolic 80, LUE, Sitting   BP CUFF SIZE Large   Height 6 ft 1 75 in   Weight 339 lb    BMI Calculated 43 82   BSA Calculated 2 71   O2 Saturation 96     Physical Exam    Constitutional   General appearance: No acute distress, well appearing and well nourished  Head and Face   Head and face: Normal     Eyes   Conjunctiva and lids: No erythema, swelling or discharge  Pupils and irises: Equal, round, reactive to light  Ophthalmoscopic examination: Abnormal   GRADE 1 CHANGES  Ears, Nose, Mouth, and Throat   External inspection of ears and nose: Normal     Otoscopic examination: Tympanic membranes translucent with normal light reflex  Canals patent without erythema  Nasal mucosa, septum, and turbinates: Normal without edema or erythema  Lips, teeth, and gums: Normal, good dentition  Oropharynx: Normal with no erythema, edema, exudate or lesions  Neck   Neck: Supple, symmetric, trachea midline, no masses  Thyroid: Normal, no thyromegaly  Pulmonary   Respiratory effort: No increased work of breathing or signs of respiratory distress  Auscultation of lungs: Clear to auscultation  Cardiovascular   Auscultation of heart: Normal rate and rhythm, normal S1 and S2, no murmurs  Carotid pulses: 2+ bilaterally  Abdominal aorta: Normal     Femoral pulses: 2+ bilaterally  Pedal pulses: 2+ bilaterally  Peripheral vascular exam: Normal     Examination of extremities for edema and/or varicosities: Abnormal   TRACE EDEMA BILAT  Chest   Chest: Normal     Abdomen   Abdomen: Abnormal   OBESE  Liver and spleen: No hepatomegaly or splenomegaly  Examination for hernias: No hernias appreciated  Anus, perineum, and rectum: Normal sphincter tone, no masses, no prolapse  Stool sample for occult blood: Negative  NEG  Genitourinary   Scrotal contents: Normal testes, no masses  Penis: Normal, no lesions  Digital rectal exam of prostate: Normal size, no masses  Lymphatic   Palpation of lymph nodes in neck: No lymphadenopathy  Palpation of lymph nodes in axillae: No lymphadenopathy  Palpation of lymph nodes in groin: No lymphadenopathy  Musculoskeletal   Gait and station: Normal     Inspection/palpation of digits and nails: Normal without clubbing or cyanosis  Inspection/palpation of joints, bones, and muscles: Normal     Range of motion: Normal     Stability: Normal     Muscle strength/tone: Normal     Skin   Skin and subcutaneous tissue: Normal without rashes or lesions  Neurologic   Cranial nerves: Cranial nerves 2-12 intact  Cortical function: Normal mental status  Reflexes: 2+ and symmetric  Sensation: No sensory loss  Coordination: Normal finger to nose and heel to shin  Psychiatric   Judgment and insight: Normal     Recent and remote memory: Intact      Mood and affect: Normal        Signatures   Electronically signed by : Tommie Arora MD; Nov 7 2017 11:00AM EST                       (Author)

## 2018-01-13 NOTE — PROGRESS NOTES
Assessment    1  Encounter for preventive health examination (V70 0) (Z00 00)   2  Benign essential hypertension (401 1) (I10)   3  Hyperlipidemia (272 4) (E78 5)   4  Obesity (278 00) (E66 9)   5  Peripheral edema (782 3) (R60 9)    Plan  Benign essential hypertension    · Follow-up visit in 6 months Evaluation and Treatment  Follow-up  Status: Hold For -  Scheduling  Requested for: 28Oct2016   · A diet low in sodium and high in potassium, magnesium, and calcium can help your  blood pressure ; Status:Complete;   Done: 24ZSF1488   · Begin a limited exercise program ; Status:Complete;   Done: 28Oct2016   · Begin or continue regular aerobic exercise  Gradually work up to at least 3 sessions of 30  minutes of exercise a week ; Status:Complete;   Done: 29IOK1072   · Continue with our present treatment plan ; Status:Complete;   Done: 28Oct2016   · Eat a low fat and low cholesterol diet ; Status:Complete;   Done: 52IJO2586   · Restrict the salt in your diet by avoiding highly salted foods ; Status:Complete;   Done:  24MCB1345   · We recommend that you bring your body mass index down to 26 ; Status:Complete;    Done: 68UBQ1746   · Call [phone] if: You become dizzy or lightheaded, especially when you stand up  after sitting for a while ; Status:Complete;   Done: 28Oct2016  Benign essential hypertension, Health Maintenance, Encounter for prostate cancer  screening, Hyperlipidemia, Obesity, Peripheral edema    · (1) PSA (SCREEN) (Dx V76 44 Screen for Prostate Cancer); Status: In Progress -  Specimen/Data Collected;   Done: 72IGP5143  Benign essential hypertension, Health Maintenance, Hyperlipidemia, Obesity, Peripheral  edema    · (1) CBC/ PLT (NO DIFF); Status: In Progress - Specimen/Data Collected;   Done:  77GZH6209   · (1) COMPREHENSIVE METABOLIC PANEL; Status: In Progress - Specimen/Data  Collected;   Done: 62WQX1158   · (1) LIPID PANEL, FASTING; Status: In Progress - Specimen/Data Collected;   Done:  26OAJ8271   · (1) TSH; Status: In Progress - Specimen/Data Collected;   Done: 00UVV9472   · (1) URIC ACID; Status: In Progress - Specimen/Data Collected;   Done: 24ESD5587   · EKG/ECG- POC; Status:Complete;   Done: 78BYM5340   · Hemoccult Screening - POC; Status:Resulted - Requires Verification;   Done: 19WWH0589  09:09AM   · Routine Venipuncture - POC; Status:Complete;   Done: 28Oct2016   · Urine Dip Automated- POC; Status:Active - Perform Order; Requested TTO:[id number];   Hyperlipidemia    · A diet low in sugar may help your condition ; Status:Complete;   Done: 28Oct2016   · Eat no more than 30 grams of fat per day ; Status:Complete;   Done: 28Oct2016  Need for influenza vaccination    · Fluzone Quadrivalent 9 MCG/STRAIN Intradermal Suspension  Pen-injector  Peripheral edema    · HydroCHLOROthiazide 12 5 MG Oral Tablet; Take one tablet daily    Discussion/Summary  Impression: health maintenance visit  Currently, he eats a healthy diet  Prostate cancer screening: prostate cancer screening is current and PSA was ordered  Testicular cancer screening: testicular cancer screening is current  Colorectal cancer screening: colorectal cancer screening is current and fecal occult blood testing is needed every year  Advice and education were given regarding cardiovascular risk reduction, sunscreen use and helmet use  DISCUSSED HEALTH MAINTENANCE ISSUES  BW WILL BE OBTAINED  RV 1 YR FOR CPX    PLENTY OF FLUIDS  MONITOR AND DECREASE DIETARY SODIUM INTAKE  INCREASE ACTIVITY  MEDICATION AS DIRECTED  RV 6 MONTHS, SOONER PRN  Chief Complaint  Patient is here today for a complete physical exam  lb/lpn      History of Present Illness  HM, Adult Male: The patient is being seen for a health maintenance evaluation  General Health: The patient's health since the last visit is described as good  He has regular dental visits  He denies vision problems  He denies hearing loss  Immunizations status: not up to date  Lifestyle:   He consumes a diverse and healthy diet  He does not have any weight concerns  He does not exercise regularly  He does not use tobacco  He consumes alcohol  Screening: cancer screening reviewed and current  metabolic screening reviewed and current  risk screening reviewed and current  HPI: [address] RECORD  FLUID RETENTION      Review of Systems    Constitutional: no fever, no chills and not feeling tired  Eyes: no eyesight problems  ENT: no sore throat and no nasal discharge  Cardiovascular: no chest pain, the heart rate was not fast, no palpitations and no extremity edema  Respiratory: no shortness of breath, no cough, no wheezing and no shortness of breath during exertion  Gastrointestinal: no nausea, no vomiting, no constipation and no diarrhea  Genitourinary: no incontinence  Musculoskeletal: no arthralgias, no myalgias and no joint stiffness  Integumentary: no rashes  Neurological: no headache, no numbness, no tingling and no dizziness  Psychiatric: no anxiety and no depression  Endocrine: no muscle weakness  Hematologic/Lymphatic: no swollen glands  ROS reviewed  Active Problems    1  Acute pain of left knee (719 46) (M25 562)   2  Benign essential hypertension (401 1) (I10)   3  Hyperlipidemia (272 4) (E78 5)   4   Obesity (278 00) (E66 9)   5  Sprain of medial collateral ligament of left knee, initial encounter (844 1) (E98 824K)    Past Medical History    · History of hemorrhoids (V13 89) (Z87 19)   · History of hypertension (V12 59) (Z86 79)   · Hyperlipidemia (272 4) (E78 5)   · Obesity (278 00) (E66 9)    Surgical History    · History of Surgery Vas Deferens Vasectomy    Family History  Mother    · Family history of cardiac disorder (V17 49) (Z82 49)   · Family history of congestive heart failure (V17 49) (Z82 49)   · Family history of melanoma (V16 8) (Z80 8)  Father    · Family history of cardiac disorder (V17 49) (Z82 49)  Brother    · History of heart artery stent  Family History    · Denied: Family history of mental disorder    Social History    · Alcohol ingestion of one to four drinks per day (V69 8) (Z78 9)   · Alcohol use   · Patient drinks 12 beers in one day   · Daily alcohol use   · Dental care, regularly   · Never smoked   · No caffeine use    Current Meds   1  Bystolic 20 MG Oral Tablet; TAKE 1 TABLET DAILY; Therapy: (Recorded:12Sep2014) to Recorded   2  Centrum Oral Tablet; TAKE 1 TABLET DAILY; Therapy: (Recorded:12Sep2014) to Recorded   3  Crestor 20 MG Oral Tablet; TAKE 1 TABLET DAILY; Therapy: (Recorded:12Sep2014) to Recorded   4  Exforge 5-160 MG Oral Tablet; TAKE 1 TABLET DAILY; Therapy: (Recorded:12Sep2014) to Recorded   5  Fish Oil 1200 MG Oral Capsule; TAKE 2 CAPSULE Daily; Therapy: (Recorded:24Mar2015) to Recorded   6  Trilipix 135 MG Oral Capsule Delayed Release; TAKE 1 CAPSULE DAILY; Therapy: (Recorded:12Sep2014) to Recorded   7  Vitamin B12 100 MCG Oral Tablet; TAKE TABLET  pt takes 1000mcg daily; Therapy: (Recorded:12Sep2014) to Recorded   8  Vitamin C 500 MG Oral Tablet; take 2 tablet daily; Therapy: (Recorded:28Oct2016) to Recorded    Allergies    1  No Known Drug Allergies    Vitals   Recorded: 20SLS9691 89:72SV   Systolic 455, LUE, Sitting    Diastolic 78, LUE, Sitting    Heart Rate 80, R Radial    Pulse Quality Normal, R Radial    Respiration Quality Normal    Respiration 24    Temperature 98 7 F    Height 6 ft 2 25 in    Weight 348 lb     BMI Calculated 44 38    BSA Calculated 2 76    Patient Refused Height No No   Patient Refused Weight No No   BP CUFF SIZE Large      Physical Exam    Constitutional   General appearance: No acute distress, well appearing and well nourished  Head and Face   Head and face: Normal     Eyes   Conjunctiva and lids: No erythema, swelling or discharge  Pupils and irises: Equal, round, reactive to light  Ophthalmoscopic examination: Abnormal   GRADE 1 CHANGES     Ears, Nose, Mouth, and Throat   External inspection of ears and nose: Normal     Otoscopic examination: Tympanic membranes translucent with normal light reflex  Canals patent without erythema  Nasal mucosa, septum, and turbinates: Normal without edema or erythema  Lips, teeth, and gums: Normal, good dentition  Oropharynx: Normal with no erythema, edema, exudate or lesions  Neck   Neck: Supple, symmetric, trachea midline, no masses  Thyroid: Normal, no thyromegaly  Pulmonary   Respiratory effort: No increased work of breathing or signs of respiratory distress  Auscultation of lungs: Clear to auscultation  Cardiovascular   Auscultation of heart: Normal rate and rhythm, normal S1 and S2, no murmurs  Carotid pulses: 2+ bilaterally  Abdominal aorta: Normal     Femoral pulses: 2+ bilaterally  Pedal pulses: 2+ bilaterally  Peripheral vascular exam: Normal     Examination of extremities for edema and/or varicosities: Abnormal   TRACE EDEMA BILAT  Chest   Chest: Normal     Abdomen   Abdomen: Abnormal   OBESE  Liver and spleen: No hepatomegaly or splenomegaly  Examination for hernias: No hernias appreciated  Anus, perineum, and rectum: Normal sphincter tone, no masses, no prolapse  Stool sample for occult blood: Negative  NEG  Genitourinary   Scrotal contents: Normal testes, no masses  Penis: Normal, no lesions  Digital rectal exam of prostate: Normal size, no masses  Lymphatic   Palpation of lymph nodes in neck: No lymphadenopathy  Palpation of lymph nodes in axillae: No lymphadenopathy  Palpation of lymph nodes in groin: No lymphadenopathy  Musculoskeletal   Gait and station: Normal     Inspection/palpation of digits and nails: Normal without clubbing or cyanosis      Inspection/palpation of joints, bones, and muscles: Normal     Range of motion: Normal     Stability: Normal     Muscle strength/tone: Normal     Skin   Skin and subcutaneous tissue: Normal without rashes or lesions  Neurologic   Cranial nerves: Cranial nerves 2-12 intact  Cortical function: Normal mental status  Reflexes: 2+ and symmetric  Sensation: No sensory loss  Coordination: Normal finger to nose and heel to shin  Psychiatric   Judgment and insight: Normal     Recent and remote memory: Intact  Mood and affect: Normal        Results/Data  Hemoccult Screening - POC 49IFI1809 09:09AM Shweta Albrecht     Test Name Result Flag Reference   Hemoccult Negative       PHQ-2 Adult Depression Screening 28Oct2016 08:26AM User, Madyson     Test Name Result Flag Reference   PHQ-2 Adult Depression Score 0     Over the last two weeks, how often have you been bothered by any of the following problems?   Little interest or pleasure in doing things: Not at all - 0  Feeling down, depressed, or hopeless: Not at all - 0   PHQ-2 Adult Depression Screening Negative         Signatures   Electronically signed by : Diamante Duarte MD; Oct 28 2016  9:46AM EST                       (Author)

## 2018-01-15 NOTE — RESULT NOTES
Message   PLEASE CALL JAS HERNANDEZ BW   SUGAR WAS SL ELEVATED   LETS HAVE AN OV WHEN HE GETS A CHANCE   THANKS     Verified Results  (1) CBC/ PLT (NO DIFF) 28Oct2016 12:00AM [facility]     Test Name Result Flag Reference   WBC 5 9 x10E3/uL  3 4-10 8   RBC 4 77 x10E6/uL  4 14-5 80   Hemoglobin 14 5 g/dL  12 6-17 7   Hematocrit 42 7 %  37 5-51 0   MCV 90 fL  79-97   MCH 30 4 pg  26 6-33 0   MCHC 34 0 g/dL  31 5-35 7   RDW 14 0 %  12 3-15 4   Platelets 402 L68U1/NV  150-379     (1) COMPREHENSIVE METABOLIC PANEL [id number] 20:40DX [facility]     Test Name Result Flag Reference   Glucose, Serum 120 mg/dL H 65-99   BUN 8 mg/dL  6-24   Creatinine, Serum 0 75 mg/dL L 0 76-1 27   eGFR If NonAfricn Am 112 mL/min/1 73  >59   eGFR If Africn Am 129 mL/min/1 73  >59   BUN/Creatinine Ratio 11  9-20   Sodium, Serum 139 mmol/L  136-144   **Please note reference interval change**   Potassium, Serum 4 2 mmol/L  3 5-5 2   **Please note reference interval change**   Chloride, Serum 101 mmol/L     **Please note reference interval change**   Carbon Dioxide, Total 18 mmol/L  18-29   Calcium, Serum 9 6 mg/dL  8 7-10 2   Protein, Total, Serum 7 4 g/dL  6 0-8 5   Albumin, Serum 4 5 g/dL  3 5-5 5   Globulin, Total 2 9 g/dL  1 5-4 5   A/G Ratio 1 6  1 1-2 5   Bilirubin, Total 0 2 mg/dL  0 0-1 2   Alkaline Phosphatase, S 63 IU/L     AST (SGOT) 65 IU/L H 0-40   ALT (SGPT) 54 IU/L H 0-44     (1) LIPID PANEL, FASTING 28Oct2016 12:00AM [facility]     Test Name Result Flag Reference   Cholesterol, Total 140 mg/dL  100-199   Triglycerides 357 mg/dL H 0-149   HDL Cholesterol 27 mg/dL L >39   According to ATP-III Guidelines, HDL-C >59 mg/dL is considered a  negative risk factor for CHD  VLDL Cholesterol Rigo 71 mg/dL H 5-40   LDL Cholesterol Calc 42 mg/dL  0-99   T  Chol/HDL Ratio 5 2 ratio units H 0 0-5 0   T   Chol/HDL Ratio                                                             Men  Women 1/2 Avg  Risk  3 4    3 3                                                   Avg Risk  5 0    4 4                                                2X Avg  Risk  9 6    7 1                                                3X Avg  Risk 23 4   11 0     (1) TSH 28Oct2016 12:00AM [facility]     Test Name Result Flag Reference   TSH 1 850 uIU/mL  0 450-4 500     (1) URIC ACID 28Oct2016 12:00AM [facility]     Test Name Result Flag Reference   Uric Acid, Serum 6 9 mg/dL  3 7-8 6   Therapeutic target for gout patients: <6 0     (1) PSA (SCREEN) (Dx V76 44 Screen for Prostate Cancer) [id number] 12:00AM [facility]     Test Name Result Flag Reference   Prostate Specific Ag, Serum 0 3 ng/mL  0 0-4 0   Roche ECLIA methodology  According to the American Urological Association, Serum PSA should  decrease and remain at undetectable levels after radical  prostatectomy  The AUA defines biochemical recurrence as an initial  PSA value 0 2 ng/mL or greater followed by a subsequent confirmatory  PSA value 0 2 ng/mL or greater  Values obtained with different assay methods or kits cannot be used  interchangeably  Results cannot be interpreted as absolute evidence  of the presence or absence of malignant disease

## 2018-02-08 PROBLEM — E78.2 MIXED HYPERLIPIDEMIA: Status: ACTIVE | Noted: 2018-02-08

## 2018-02-08 PROBLEM — J01.40 ACUTE NON-RECURRENT PANSINUSITIS: Status: ACTIVE | Noted: 2018-02-08

## 2018-02-08 PROBLEM — J06.9 URTI (ACUTE UPPER RESPIRATORY INFECTION): Status: ACTIVE | Noted: 2018-02-08

## 2018-02-08 PROBLEM — K42.9 UMBILICAL HERNIA WITHOUT OBSTRUCTION AND WITHOUT GANGRENE: Status: ACTIVE | Noted: 2017-06-01

## 2018-02-08 RX ORDER — AMOXICILLIN 500 MG
2 CAPSULE ORAL DAILY
COMMUNITY

## 2018-02-08 RX ORDER — MULTIVIT-MIN/IRON/FOLIC ACID/K 18-600-40
2 CAPSULE ORAL DAILY
COMMUNITY

## 2018-02-09 ENCOUNTER — OFFICE VISIT (OUTPATIENT)
Dept: FAMILY MEDICINE CLINIC | Facility: CLINIC | Age: 48
End: 2018-02-09
Payer: COMMERCIAL

## 2018-02-09 VITALS
WEIGHT: 315 LBS | HEART RATE: 72 BPM | SYSTOLIC BLOOD PRESSURE: 130 MMHG | HEIGHT: 76 IN | TEMPERATURE: 98.2 F | BODY MASS INDEX: 38.36 KG/M2 | OXYGEN SATURATION: 96 % | DIASTOLIC BLOOD PRESSURE: 88 MMHG | RESPIRATION RATE: 20 BRPM

## 2018-02-09 DIAGNOSIS — R05.9 COUGH: ICD-10-CM

## 2018-02-09 DIAGNOSIS — J06.9 URTI (ACUTE UPPER RESPIRATORY INFECTION): ICD-10-CM

## 2018-02-09 DIAGNOSIS — K13.21 LEUKOPLAKIA OF TONGUE: ICD-10-CM

## 2018-02-09 DIAGNOSIS — K14.8 TONGUE LESION: ICD-10-CM

## 2018-02-09 DIAGNOSIS — J01.40 ACUTE NON-RECURRENT PANSINUSITIS: Primary | ICD-10-CM

## 2018-02-09 PROBLEM — K75.81 NASH (NONALCOHOLIC STEATOHEPATITIS): Status: ACTIVE | Noted: 2018-01-19

## 2018-02-09 LAB
SL AMB POCT RAPID FLU A: NEGATIVE
SL AMB POCT RAPID FLU B: NEGATIVE

## 2018-02-09 PROCEDURE — 99213 OFFICE O/P EST LOW 20 MIN: CPT | Performed by: FAMILY MEDICINE

## 2018-02-09 PROCEDURE — 3008F BODY MASS INDEX DOCD: CPT | Performed by: FAMILY MEDICINE

## 2018-02-09 PROCEDURE — 87804 INFLUENZA ASSAY W/OPTIC: CPT | Performed by: FAMILY MEDICINE

## 2018-02-09 RX ORDER — AMOXICILLIN AND CLAVULANATE POTASSIUM 500; 125 MG/1; MG/1
1 TABLET, FILM COATED ORAL EVERY 12 HOURS SCHEDULED
Qty: 20 TABLET | Refills: 0 | Status: SHIPPED | OUTPATIENT
Start: 2018-02-09 | End: 2018-02-19

## 2018-02-09 RX ORDER — COLCHICINE 0.6 MG/1
TABLET, FILM COATED ORAL
COMMUNITY
Start: 2018-01-19 | End: 2021-11-22

## 2018-02-09 RX ORDER — NEBIVOLOL HYDROCHLORIDE 10 MG/1
TABLET ORAL
COMMUNITY
Start: 2017-11-26

## 2018-02-09 NOTE — PROGRESS NOTES
Assessment/Plan:    Tongue lesion  PAST 2 MONTHS  NOTICED A WHITE SPOT ON TONGUE  DOES NOT REMEMBER ANY TRAUMA  DOES NOT SEEM TO BE HEALING         Diagnoses and all orders for this visit:    Acute non-recurrent pansinusitis  -     POCT rapid flu A and B  -     amoxicillin-clavulanate (AUGMENTIN) 500-125 mg per tablet; Take 1 tablet by mouth every 12 (twelve) hours for 10 days    URTI (acute upper respiratory infection)  -     POCT rapid flu A and B    Cough  -     POCT rapid flu A and B    Tongue lesion    Leukoplakia of tongue    Other orders  -     Ascorbic Acid (VITAMIN C) 500 MG CAPS; Take 2 tablets by mouth daily  -     Omega-3 Fatty Acids (FISH OIL) 1200 MG CAPS; Take 2 capsules by mouth daily  -     Discontinue: choline fenofibrate (TRILIPIX) 135 MG capsule; Take 135 mg by mouth  -     choline fenofibrate (TRILIPIX) 135 MG capsule; TAKE 1 CAPSULE DAILY  -     COLCRYS 0 6 MG tablet;   -     BYSTOLIC 10 MG tablet;           Subjective:      Patient ID: Ping Gómez is a 41 y o  male  Sinusitis   This is a new problem  The current episode started in the past 7 days  The problem has been gradually worsening since onset  There has been no fever  The pain is mild  Associated symptoms include chills, congestion, coughing, headaches, sinus pressure and a sore throat  Pertinent negatives include no ear pain, hoarse voice, neck pain, shortness of breath, sneezing or swollen glands  Past treatments include acetaminophen  The treatment provided mild relief  The following portions of the patient's history were reviewed and updated as appropriate: allergies, current medications, past family history, past medical history, past social history, past surgical history and problem list     Review of Systems   Constitutional: Positive for chills  HENT: Positive for congestion, sinus pressure and sore throat  Negative for ear pain, hoarse voice and sneezing  Eyes: Negative for discharge     Respiratory: Positive for cough  Negative for shortness of breath  Cardiovascular: Negative for chest pain  Gastrointestinal: Negative for abdominal pain, diarrhea, nausea and vomiting  Genitourinary: Negative for dysuria  Musculoskeletal: Negative for arthralgias, myalgias and neck pain  Neurological: Positive for headaches  Hematological: Negative for adenopathy  Psychiatric/Behavioral: The patient is not nervous/anxious  Objective:    Vitals:    02/09/18 0956   BP: 130/88   Pulse: 72   Resp: 20   Temp: 98 2 °F (36 8 °C)   SpO2: 96%        Physical Exam   Constitutional: He is oriented to person, place, and time  He appears well-developed and well-nourished  HENT:   Head: Normocephalic and atraumatic  Right Ear: No drainage  Tympanic membrane is erythematous and bulging  A middle ear effusion is present  Left Ear: No drainage  Tympanic membrane is erythematous and bulging  A middle ear effusion is present  Nose: Mucosal edema and rhinorrhea present  Mouth/Throat: Uvula is midline  Posterior oropharyngeal erythema present  No oropharyngeal exudate  Eyes: Pupils are equal, round, and reactive to light  Right eye exhibits no discharge  Left eye exhibits no discharge  Neck: Normal range of motion  Neck supple  Cardiovascular: Normal rate, regular rhythm and normal heart sounds  No murmur heard  Pulmonary/Chest: Effort normal and breath sounds normal  He has no wheezes  He has no rales  Abdominal: Soft  Bowel sounds are normal  There is no tenderness  Musculoskeletal: Normal range of motion  He exhibits no edema  Lymphadenopathy:     He has no cervical adenopathy  Neurological: He is alert and oriented to person, place, and time  Skin: Skin is warm and dry  No rash noted  Nursing note and vitals reviewed

## 2018-04-12 ENCOUNTER — TRANSCRIBE ORDERS (OUTPATIENT)
Dept: ADMINISTRATIVE | Facility: HOSPITAL | Age: 48
End: 2018-04-12

## 2018-04-12 DIAGNOSIS — M14.672 CHARCOT ANKLE, LEFT: Primary | ICD-10-CM

## 2018-04-18 ENCOUNTER — HOSPITAL ENCOUNTER (OUTPATIENT)
Dept: RADIOLOGY | Facility: HOSPITAL | Age: 48
Discharge: HOME | End: 2018-04-18
Attending: PODIATRIST
Payer: COMMERCIAL

## 2018-04-18 DIAGNOSIS — M14.672 CHARCOT ANKLE, LEFT: ICD-10-CM

## 2018-04-18 PROCEDURE — 73718 MRI LOWER EXTREMITY W/O DYE: CPT

## 2018-05-07 ENCOUNTER — OFFICE VISIT (OUTPATIENT)
Dept: FAMILY MEDICINE CLINIC | Facility: CLINIC | Age: 48
End: 2018-05-07
Payer: COMMERCIAL

## 2018-05-07 VITALS
RESPIRATION RATE: 18 BRPM | SYSTOLIC BLOOD PRESSURE: 150 MMHG | HEART RATE: 68 BPM | WEIGHT: 315 LBS | HEIGHT: 77 IN | TEMPERATURE: 98.2 F | DIASTOLIC BLOOD PRESSURE: 90 MMHG | BODY MASS INDEX: 37.19 KG/M2

## 2018-05-07 DIAGNOSIS — M14.679 CHARCOT ANKLE, UNSPECIFIED LATERALITY: ICD-10-CM

## 2018-05-07 DIAGNOSIS — E78.2 MIXED HYPERLIPIDEMIA: ICD-10-CM

## 2018-05-07 DIAGNOSIS — I10 BENIGN ESSENTIAL HYPERTENSION: Primary | ICD-10-CM

## 2018-05-07 PROBLEM — R05.9 COUGH: Status: RESOLVED | Noted: 2018-02-09 | Resolved: 2018-05-07

## 2018-05-07 PROBLEM — J06.9 URTI (ACUTE UPPER RESPIRATORY INFECTION): Status: RESOLVED | Noted: 2018-02-08 | Resolved: 2018-05-07

## 2018-05-07 PROBLEM — K42.9 UMBILICAL HERNIA WITHOUT OBSTRUCTION AND WITHOUT GANGRENE: Status: RESOLVED | Noted: 2017-06-01 | Resolved: 2018-05-07

## 2018-05-07 PROBLEM — J01.40 ACUTE NON-RECURRENT PANSINUSITIS: Status: RESOLVED | Noted: 2018-02-08 | Resolved: 2018-05-07

## 2018-05-07 PROCEDURE — 36415 COLL VENOUS BLD VENIPUNCTURE: CPT | Performed by: FAMILY MEDICINE

## 2018-05-07 PROCEDURE — 99214 OFFICE O/P EST MOD 30 MIN: CPT | Performed by: FAMILY MEDICINE

## 2018-05-07 RX ORDER — ROSUVASTATIN CALCIUM 20 MG/1
TABLET, COATED ORAL
COMMUNITY
Start: 2018-04-11

## 2018-05-07 NOTE — ASSESSMENT & PLAN NOTE
WATCHING CHOLESTEROL INTAKE  COMPLIANT WITH MEDICATION  NO CONCERNS    - CONTINUE TO MONITOR DIETARY CHOL INTAKE  - CONTINUE CURRENT MEDICATION  - ENCOURAGED PHYSICAL ACTIVITY  - BW DUE

## 2018-05-07 NOTE — PROGRESS NOTES
Assessment/Plan:    Problem List Items Addressed This Visit     Benign essential hypertension - Primary     STABLE  DENIES ANY CP, SOB, PALPITATIONS, OR HEADACHE  NOTES NO WATER RETENTION  COMPLIANT WITH MEDICATION  NO CONCERNS    - CONTINUE CURRENT TREATMENT PLAN  - MONITOR DIETARY SODIUM INTAKE  - ENCOURAGE PHYSICAL ACTIVITY  - RV 6 MONTHS         Relevant Orders    Comprehensive metabolic panel    Mixed hyperlipidemia     WATCHING CHOLESTEROL INTAKE  COMPLIANT WITH MEDICATION  NO CONCERNS    - CONTINUE TO MONITOR DIETARY CHOL INTAKE  - CONTINUE CURRENT MEDICATION  - ENCOURAGED PHYSICAL ACTIVITY  - BW DUE           Relevant Medications    rosuvastatin (CRESTOR) 20 MG tablet    Other Relevant Orders    Comprehensive metabolic panel    Lipid panel    Charcot ankle, unspecified laterality     DISCUSSED RECENT MRI FINDING  PT HAS CHRONIC PAIN  REVIEWED OPTIONS    - RECOMMEND SECOND OPINION         Relevant Orders    Ambulatory referral to Orthopedic Surgery          Patient Instructions   PLENTY OF FLUIDS  MONITOR SODIUM ( SALT ) IN YOUR DIET  ENCOURAGE AN INCREASE IN EXERCISE AND ACTIVITY  MEDICATION AS DIRECTED  REVISIT IN 6 MONTHS, SOONER PRN      Return in about 6 weeks (around 6/18/2018) for Recheck, Annual physical     Subjective:      Patient ID: Benito Agudelo is a 41 y o  male  Chief Complaint   Patient presents with    Follow-up     BP       PATIENT RETURNS FOR ROUTINE EVALUATION OF PATIENT'S MEDICAL ISSUES    INDIVIDUAL MEDICAL ISSUES WITH THEIR CURRENT STATUS, ASSESSMENT AND PLANS ARE LISTED ABOVE          The following portions of the patient's history were reviewed and updated as appropriate: allergies, current medications, past family history, past medical history, past social history, past surgical history and problem list     Review of Systems   Constitutional: Negative for chills, fatigue and fever     HENT: Negative for congestion, ear discharge, ear pain, mouth sores, postnasal drip, sore throat and trouble swallowing  Eyes: Negative for pain, discharge and visual disturbance  Respiratory: Negative for cough, shortness of breath and wheezing  Cardiovascular: Negative for chest pain, palpitations and leg swelling  Gastrointestinal: Negative for abdominal distention, abdominal pain, blood in stool, diarrhea and nausea  Endocrine: Negative for polydipsia, polyphagia and polyuria  Genitourinary: Negative for dysuria, frequency, hematuria and urgency  Musculoskeletal: Positive for arthralgias  Negative for gait problem and joint swelling  Skin: Negative for pallor and rash  Neurological: Negative for dizziness, syncope, speech difficulty, weakness, light-headedness, numbness and headaches  Hematological: Negative for adenopathy  Psychiatric/Behavioral: Negative for behavioral problems, confusion and sleep disturbance  The patient is not nervous/anxious  Current Outpatient Prescriptions   Medication Sig Dispense Refill    amLODIPine-valsartan (EXFORGE) 5-160 MG per tablet Take 1 tablet by mouth daily      Ascorbic Acid (VITAMIN C) 500 MG CAPS Take 2 tablets by mouth daily      BYSTOLIC 10 MG tablet       choline fenofibrate (TRILIPIX) 135 MG capsule TAKE 1 CAPSULE DAILY      COLCRYS 0 6 MG tablet       cyanocobalamin 1000 MCG tablet Take 100 mcg by mouth daily      Multiple Vitamins-Minerals (CENTRUM ADULTS PO) Take by mouth every morning      Omega-3 Fatty Acids (FISH OIL) 1200 MG CAPS Take 2 capsules by mouth daily      rosuvastatin (CRESTOR) 20 MG tablet        No current facility-administered medications for this visit  Objective:    /90 (BP Location: Left arm, Patient Position: Sitting, Cuff Size: Extra-Large)   Pulse 68   Temp 98 2 °F (36 8 °C) (Temporal)   Resp 18   Ht 6' 5" (1 956 m)   Wt (!) 160 kg (352 lb)   BMI 41 74 kg/m²        Physical Exam   Constitutional: He is oriented to person, place, and time   He appears well-developed and well-nourished  HENT:   Head: Normocephalic and atraumatic  Eyes: Conjunctivae and EOM are normal  Pupils are equal, round, and reactive to light  Right eye exhibits no discharge  Left eye exhibits no discharge  Neck: Neck supple  No JVD present  No thyromegaly present  Cardiovascular: Normal rate, regular rhythm and normal heart sounds  No murmur heard  Pulmonary/Chest: Effort normal and breath sounds normal  He has no wheezes  He has no rales  Abdominal: Soft  Bowel sounds are normal  He exhibits no mass  There is no hepatosplenomegaly  There is no tenderness  There is no rebound, no guarding and no CVA tenderness  OBESE   Musculoskeletal: Normal range of motion  He exhibits edema  He exhibits no tenderness or deformity  MILD DJD FINDINGS   Lymphadenopathy:     He has no cervical adenopathy  He has no axillary adenopathy  Neurological: He is alert and oriented to person, place, and time  Skin: Skin is warm and dry  No rash noted  No erythema  Psychiatric: He has a normal mood and affect   His behavior is normal  Judgment and thought content normal               Immanuel Maher MD

## 2018-05-08 LAB
ALBUMIN SERPL-MCNC: 4.6 G/DL (ref 3.5–5.5)
ALBUMIN/GLOB SERPL: 1.5 {RATIO} (ref 1.2–2.2)
ALP SERPL-CCNC: 61 IU/L (ref 39–117)
ALT SERPL-CCNC: 43 IU/L (ref 0–44)
AST SERPL-CCNC: 51 IU/L (ref 0–40)
BILIRUB SERPL-MCNC: 0.5 MG/DL (ref 0–1.2)
BUN SERPL-MCNC: 10 MG/DL (ref 6–24)
BUN/CREAT SERPL: 11 (ref 9–20)
CALCIUM SERPL-MCNC: 9.5 MG/DL (ref 8.7–10.2)
CHLORIDE SERPL-SCNC: 100 MMOL/L (ref 96–106)
CHOLEST SERPL-MCNC: 151 MG/DL (ref 100–199)
CHOLEST/HDLC SERPL: 4.9 RATIO (ref 0–5)
CO2 SERPL-SCNC: 23 MMOL/L (ref 18–29)
CREAT SERPL-MCNC: 0.89 MG/DL (ref 0.76–1.27)
GLOBULIN SER-MCNC: 3 G/DL (ref 1.5–4.5)
GLUCOSE SERPL-MCNC: 109 MG/DL (ref 65–99)
HDLC SERPL-MCNC: 31 MG/DL
LDLC SERPL CALC-MCNC: ABNORMAL MG/DL (ref 0–99)
LDLC SERPL DIRECT ASSAY-MCNC: 65 MG/DL (ref 0–99)
POTASSIUM SERPL-SCNC: 4.6 MMOL/L (ref 3.5–5.2)
PROT SERPL-MCNC: 7.6 G/DL (ref 6–8.5)
SL AMB EGFR AFRICAN AMERICAN: 119 ML/MIN/1.73
SL AMB EGFR NON AFRICAN AMERICAN: 103 ML/MIN/1.73
SL AMB VLDL CHOLESTEROL CALC: ABNORMAL MG/DL (ref 5–40)
SODIUM SERPL-SCNC: 140 MMOL/L (ref 134–144)
TRIGL SERPL-MCNC: 405 MG/DL (ref 0–149)

## 2018-11-12 ENCOUNTER — OFFICE VISIT (OUTPATIENT)
Dept: FAMILY MEDICINE CLINIC | Facility: CLINIC | Age: 48
End: 2018-11-12
Payer: COMMERCIAL

## 2018-11-12 VITALS
BODY MASS INDEX: 39.17 KG/M2 | TEMPERATURE: 98 F | HEIGHT: 75 IN | OXYGEN SATURATION: 98 % | DIASTOLIC BLOOD PRESSURE: 70 MMHG | WEIGHT: 315 LBS | HEART RATE: 79 BPM | RESPIRATION RATE: 16 BRPM | SYSTOLIC BLOOD PRESSURE: 160 MMHG

## 2018-11-12 DIAGNOSIS — K75.81 NASH (NONALCOHOLIC STEATOHEPATITIS): ICD-10-CM

## 2018-11-12 DIAGNOSIS — Z13.29 SCREENING FOR HYPOTHYROIDISM: ICD-10-CM

## 2018-11-12 DIAGNOSIS — Z00.00 ROUTINE GENERAL MEDICAL EXAMINATION AT A HEALTH CARE FACILITY: Primary | ICD-10-CM

## 2018-11-12 DIAGNOSIS — R73.9 ELEVATED BLOOD SUGAR: ICD-10-CM

## 2018-11-12 DIAGNOSIS — E78.2 MIXED HYPERLIPIDEMIA: ICD-10-CM

## 2018-11-12 DIAGNOSIS — Z23 NEED FOR INFLUENZA VACCINATION: ICD-10-CM

## 2018-11-12 DIAGNOSIS — Z12.11 COLON CANCER SCREENING: ICD-10-CM

## 2018-11-12 DIAGNOSIS — E66.01 CLASS 3 SEVERE OBESITY DUE TO EXCESS CALORIES WITH SERIOUS COMORBIDITY AND BODY MASS INDEX (BMI) OF 40.0 TO 44.9 IN ADULT (HCC): ICD-10-CM

## 2018-11-12 DIAGNOSIS — I10 BENIGN ESSENTIAL HYPERTENSION: ICD-10-CM

## 2018-11-12 DIAGNOSIS — Z12.5 ENCOUNTER FOR PROSTATE CANCER SCREENING: ICD-10-CM

## 2018-11-12 PROBLEM — E66.813 CLASS 3 SEVERE OBESITY DUE TO EXCESS CALORIES WITH SERIOUS COMORBIDITY AND BODY MASS INDEX (BMI) OF 40.0 TO 44.9 IN ADULT (HCC): Status: ACTIVE | Noted: 2018-11-12

## 2018-11-12 LAB
SL AMB  POCT GLUCOSE, UA: 0
SL AMB LEUKOCYTE ESTERASE,UA: 0
SL AMB POCT BILIRUBIN,UA: 0
SL AMB POCT BLOOD,UA: 0
SL AMB POCT CLARITY,UA: CLEAR
SL AMB POCT COLOR,UA: YELLOW
SL AMB POCT FECES OCC BLD: NORMAL
SL AMB POCT KETONES,UA: 0
SL AMB POCT NITRITE,UA: 0
SL AMB POCT PH,UA: 5
SL AMB POCT SPECIFIC GRAVITY,UA: 1.01
SL AMB POCT URINE PROTEIN: 0
SL AMB POCT UROBILINOGEN: 0

## 2018-11-12 PROCEDURE — 90471 IMMUNIZATION ADMIN: CPT | Performed by: FAMILY MEDICINE

## 2018-11-12 PROCEDURE — 99396 PREV VISIT EST AGE 40-64: CPT | Performed by: FAMILY MEDICINE

## 2018-11-12 PROCEDURE — 81003 URINALYSIS AUTO W/O SCOPE: CPT | Performed by: FAMILY MEDICINE

## 2018-11-12 PROCEDURE — 90686 IIV4 VACC NO PRSV 0.5 ML IM: CPT | Performed by: FAMILY MEDICINE

## 2018-11-12 PROCEDURE — 82270 OCCULT BLOOD FECES: CPT | Performed by: FAMILY MEDICINE

## 2018-11-12 PROCEDURE — 93000 ELECTROCARDIOGRAM COMPLETE: CPT | Performed by: FAMILY MEDICINE

## 2018-11-12 RX ORDER — TRIAMTERENE AND HYDROCHLOROTHIAZIDE 37.5; 25 MG/1; MG/1
1 TABLET ORAL DAILY
Qty: 90 TABLET | Refills: 1 | Status: SHIPPED | OUTPATIENT
Start: 2018-11-12 | End: 2018-12-07 | Stop reason: SDUPTHER

## 2018-11-12 NOTE — PROGRESS NOTES
FAMILY PRACTICE HEALTH MAINTENANCE OFFICE VISIT  [facility]    NAME: Elizabeth Fenton  AGE: 57 y o  SEX: male  : 1970     DATE: 2018    Assessment and Plan     Problem List Items Addressed This Visit        Digestive    OSMAN (nonalcoholic steatohepatitis)       Cardiovascular and Mediastinum    Benign essential hypertension    Relevant Medications    triamterene-hydrochlorothiazide (MAXZIDE-25) 37 5-25 mg per tablet    Other Relevant Orders    POCT ECG (Completed)    POCT urine dip auto non-scope (Completed)    Comprehensive metabolic panel    Uric acid       Other    Elevated blood sugar    Relevant Orders    Hemoglobin A1C    Microalbumin / creatinine urine ratio    Mixed hyperlipidemia    Relevant Orders    TSH, 3rd generation    Lipid panel    Routine general medical examination at a health care facility - Primary    Relevant Orders    CBC and differential    Screening for hypothyroidism    Relevant Orders    TSH, 3rd generation    Encounter for prostate cancer screening    Relevant Orders    PSA, total and free    Colon cancer screening    Relevant Orders    POCT hemoccult screening    Class 3 severe obesity due to excess calories with serious comorbidity and body mass index (BMI) of 40 0 to 44 9 in adult ([address] )     DIETARY MODIFICATION  INCREASE ACTIVITY                      Return in about 6 months (around 2019) for Recheck          Chief Complaint     Chief Complaint   Patient presents with    Physical Exam    Flu Vaccine       History of Present Illness     [address] RECORD  NO CONCERNS AT THIS TIME          Well Adult Physical   Patient here for a comprehensive physical exam       Diet and Physical Activity  Diet: well balanced diet  Weight concerns: Patient has class 3 obesity (BMI >40)  Exercise: occasionally      Depression Screen  PHQ-9 Depression Screening    PHQ-9:    Frequency of the following problems over the past two weeks:               General Health  Hearing: Normal:  bilateral  Vision: no vision problems  Dental: regular dental visits    Reproductive Health          The following portions of the patient's history were reviewed and updated as appropriate: allergies, current medications, past family history, past medical history, past social history, past surgical history and problem list     Review of Systems     Review of Systems   Constitutional: Negative for chills, fatigue and fever  HENT: Negative for congestion, ear discharge, ear pain, mouth sores, postnasal drip, sore throat and trouble swallowing  Eyes: Negative for pain, discharge and visual disturbance  Respiratory: Negative for cough, shortness of breath and wheezing  Cardiovascular: Negative for chest pain, palpitations and leg swelling  Gastrointestinal: Negative for abdominal distention, abdominal pain, blood in stool, diarrhea and nausea  Endocrine: Negative for polydipsia, polyphagia and polyuria  Genitourinary: Negative for dysuria, frequency, hematuria and urgency  Musculoskeletal: Negative for arthralgias, gait problem and joint swelling  Skin: Negative for pallor and rash  Neurological: Negative for dizziness, syncope, speech difficulty, weakness, light-headedness, numbness and headaches  Hematological: Negative for adenopathy  Psychiatric/Behavioral: Negative for behavioral problems, confusion and sleep disturbance  The patient is not nervous/anxious          Past Medical History     Past Medical History:   Diagnosis Date    Hemorrhoids     last assessed-9/12/2014    Hyperlipidemia     last assessed-11/7/2017    Hypertension     Obesity     PONV (postoperative nausea and vomiting)     this was with a brother       Past Surgical History     Past Surgical History:   Procedure Laterality Date    HAND FRACTURE REPAIR Left     repait 1990    NV REPAIR INCISIONAL HERNIA,REDUCIBLE N/A 10/9/2017 Procedure: REPAIR HERNIA VENTRAL WITH MESH;  Surgeon: Estefany Grant MD;  Location: [facility];  Service: General    VASECTOMY      surgery vas deferens vasectomy       Social History     Social History     Social History    Marital status: /Civil Union     Spouse name: N/A    Number of children: N/A    Years of education: N/A     Social History Main Topics    Smoking status: Never Smoker    Smokeless tobacco: Former User     Types: Chew    Alcohol use Yes      Comment: socially 4x week  [place] [facility] [place] Simple Per allscripts, alcohol ingestion of one to four drinks per day    Drug use: No    Sexual activity: Not on file     Other Topics Concern    Not on file     Social History Narrative    Dental care, regularly    No caffeine use       Family History     Family History   Problem Relation Age of Onset    Heart disease Mother         cardiac disorder    Heart failure Mother         CHF    Melanoma Mother     Heart disease Father         cardiac disorder    Heart disease Brother         heart artery stent    Substance Abuse Neg Hx     Mental illness Neg Hx        Current Medications       Current Outpatient Prescriptions:     amLODIPine-valsartan (EXFORGE) 5-160 MG per tablet, Take 1 tablet by mouth daily, Disp: , Rfl:     Ascorbic Acid (VITAMIN C) 500 MG CAPS, Take 2 tablets by mouth daily, Disp: , Rfl:     BYSTOLIC 10 MG tablet, , Disp: , Rfl:     choline fenofibrate (TRILIPIX) 135 MG capsule, TAKE 1 CAPSULE DAILY, Disp: , Rfl:     Multiple Vitamins-Minerals (CENTRUM ADULTS PO), Take by mouth every morning, Disp: , Rfl:     Omega-3 Fatty Acids (FISH OIL) 1200 MG CAPS, Take 2 capsules by mouth daily, Disp: , Rfl:     rosuvastatin (CRESTOR) 20 MG tablet, , Disp: , Rfl:     COLCRYS 0 6 MG tablet, , Disp: , Rfl:     cyanocobalamin 1000 MCG tablet, Take 100 mcg by mouth daily, Disp: , Rfl:     triamterene-hydrochlorothiazide (MAXZIDE-25) 37 5-25 mg per tablet, Take 1 tablet by mouth daily, Disp: 90 tablet, Rfl: 1     Allergies     No Known Allergies    Objective     /70 (BP Location: Left arm, Patient Position: Sitting, Cuff Size: Extra-Large)   Pulse 79   Temp 98 °F (36 7 °C) (Temporal)   Resp 16   Ht 6' 3" (1 905 m)   Wt (!) 162 kg (357 lb)   SpO2 98%   BMI 44 62 kg/m²      Physical Exam   Constitutional: He is oriented to person, place, and time  He appears well-developed and well-nourished  HENT:   Head: Normocephalic and atraumatic  Right Ear: External ear normal    Left Ear: External ear normal    Nose: Nose normal    Mouth/Throat: Oropharynx is clear and moist    Eyes: Pupils are equal, round, and reactive to light  Conjunctivae and EOM are normal  Right eye exhibits no discharge  Left eye exhibits no discharge  Neck: Neck supple  No JVD present  No thyromegaly present  Cardiovascular: Normal rate, regular rhythm, normal heart sounds and intact distal pulses  No murmur heard  Pulmonary/Chest: Effort normal and breath sounds normal  He has no wheezes  He has no rales  Abdominal: Soft  Bowel sounds are normal  He exhibits no mass  There is no hepatosplenomegaly  There is no tenderness  There is no rebound, no guarding and no CVA tenderness  Genitourinary: Rectum normal, prostate normal and penis normal  Rectal exam shows guaiac negative stool  Musculoskeletal: Normal range of motion  He exhibits no edema, tenderness or deformity  Lymphadenopathy:     He has no cervical adenopathy  He has no axillary adenopathy  Neurological: He is alert and oriented to person, place, and time  He has normal reflexes  No cranial nerve deficit  He exhibits normal muscle tone  Coordination normal    Skin: Skin is warm and dry  No rash noted  No erythema  Psychiatric: He has a normal mood and affect   His behavior is normal  Judgment and thought content normal          No exam data present    Health Maintenance     Health Maintenance   Topic Date Due    DTaP,Tdap,and Td Vaccines (1 - Tdap) 10/19/2010    INFLUENZA VACCINE  07/01/2018    Depression Screening PHQ  05/07/2019     Immunization History   Administered Date(s) Administered    Influenza Quadrivalent Preservative Free ID 10/28/2016    Influenza Quadrivalent, 6-35 Months IM 11/07/2017    Td (adult), adsorbed 10/18/2010       Isael Izquierdo MD  [facility]

## 2018-11-13 LAB
ALBUMIN SERPL-MCNC: 4.4 G/DL (ref 3.5–5.5)
ALBUMIN/CREAT UR: 5.7 MG/G CREAT (ref 0–30)
ALBUMIN/GLOB SERPL: 1.4 {RATIO} (ref 1.2–2.2)
ALP SERPL-CCNC: 76 IU/L (ref 39–117)
ALT SERPL-CCNC: 34 IU/L (ref 0–44)
AST SERPL-CCNC: 39 IU/L (ref 0–40)
BASOPHILS # BLD AUTO: 0 X10E3/UL (ref 0–0.2)
BASOPHILS NFR BLD AUTO: 1 %
BILIRUB SERPL-MCNC: 0.4 MG/DL (ref 0–1.2)
BUN SERPL-MCNC: 10 MG/DL (ref 6–24)
BUN/CREAT SERPL: 12 (ref 9–20)
CALCIUM SERPL-MCNC: 9.6 MG/DL (ref 8.7–10.2)
CHLORIDE SERPL-SCNC: 103 MMOL/L (ref 96–106)
CO2 SERPL-SCNC: 22 MMOL/L (ref 20–29)
CREAT SERPL-MCNC: 0.83 MG/DL (ref 0.76–1.27)
CREAT UR-MCNC: 129.7 MG/DL
EOSINOPHIL # BLD AUTO: 0.3 X10E3/UL (ref 0–0.4)
EOSINOPHIL NFR BLD AUTO: 5 %
ERYTHROCYTE [DISTWIDTH] IN BLOOD BY AUTOMATED COUNT: 14.5 % (ref 12.3–15.4)
EST. AVERAGE GLUCOSE BLD GHB EST-MCNC: 123 MG/DL
GLOBULIN SER-MCNC: 3.1 G/DL (ref 1.5–4.5)
GLUCOSE SERPL-MCNC: 93 MG/DL (ref 65–99)
HBA1C MFR BLD: 5.9 % (ref 4.8–5.6)
HCT VFR BLD AUTO: 41.6 % (ref 37.5–51)
HGB BLD-MCNC: 14.2 G/DL (ref 13–17.7)
IMM GRANULOCYTES # BLD: 0 X10E3/UL (ref 0–0.1)
IMM GRANULOCYTES NFR BLD: 0 %
LYMPHOCYTES # BLD AUTO: 1.9 X10E3/UL (ref 0.7–3.1)
LYMPHOCYTES NFR BLD AUTO: 28 %
MCH RBC QN AUTO: 30.4 PG (ref 26.6–33)
MCHC RBC AUTO-ENTMCNC: 34.1 G/DL (ref 31.5–35.7)
MCV RBC AUTO: 89 FL (ref 79–97)
MICROALBUMIN UR-MCNC: 7.4 UG/ML
MONOCYTES # BLD AUTO: 0.3 X10E3/UL (ref 0.1–0.9)
MONOCYTES NFR BLD AUTO: 5 %
NEUTROPHILS # BLD AUTO: 4.1 X10E3/UL (ref 1.4–7)
NEUTROPHILS NFR BLD AUTO: 61 %
PLATELET # BLD AUTO: 166 X10E3/UL (ref 150–379)
POTASSIUM SERPL-SCNC: 4.2 MMOL/L (ref 3.5–5.2)
PROT SERPL-MCNC: 7.5 G/DL (ref 6–8.5)
PSA FREE MFR SERPL: 26.7 %
PSA FREE SERPL-MCNC: 0.08 NG/ML
PSA SERPL-MCNC: 0.3 NG/ML (ref 0–4)
RBC # BLD AUTO: 4.67 X10E6/UL (ref 4.14–5.8)
SL AMB EGFR AFRICAN AMERICAN: 122 ML/MIN/1.73
SL AMB EGFR NON AFRICAN AMERICAN: 106 ML/MIN/1.73
SODIUM SERPL-SCNC: 143 MMOL/L (ref 134–144)
TSH SERPL DL<=0.005 MIU/L-ACNC: 2.42 UIU/ML (ref 0.45–4.5)
URATE SERPL-MCNC: 7.2 MG/DL (ref 3.7–8.6)
WBC # BLD AUTO: 6.6 X10E3/UL (ref 3.4–10.8)

## 2018-11-14 LAB
CHOLEST SERPL-MCNC: 145 MG/DL (ref 100–199)
CHOLEST/HDLC SERPL: 4.7 RATIO (ref 0–5)
HDLC SERPL-MCNC: 31 MG/DL
LDLC SERPL CALC-MCNC: 54 MG/DL (ref 0–99)
LDLC SERPL DIRECT ASSAY-MCNC: 76 MG/DL (ref 0–99)
SL AMB VLDL CHOLESTEROL CALC: 60 MG/DL (ref 5–40)
TRIGL SERPL-MCNC: 299 MG/DL (ref 0–149)

## 2018-12-07 ENCOUNTER — OFFICE VISIT (OUTPATIENT)
Dept: FAMILY MEDICINE CLINIC | Facility: CLINIC | Age: 48
End: 2018-12-07
Payer: COMMERCIAL

## 2018-12-07 VITALS
HEART RATE: 70 BPM | DIASTOLIC BLOOD PRESSURE: 80 MMHG | BODY MASS INDEX: 20.14 KG/M2 | HEIGHT: 75 IN | RESPIRATION RATE: 20 BRPM | WEIGHT: 162 LBS | OXYGEN SATURATION: 95 % | SYSTOLIC BLOOD PRESSURE: 124 MMHG | TEMPERATURE: 98.4 F

## 2018-12-07 DIAGNOSIS — R73.9 ELEVATED BLOOD SUGAR: Primary | ICD-10-CM

## 2018-12-07 DIAGNOSIS — I10 BENIGN ESSENTIAL HYPERTENSION: ICD-10-CM

## 2018-12-07 PROBLEM — Z12.5 ENCOUNTER FOR PROSTATE CANCER SCREENING: Status: RESOLVED | Noted: 2018-11-12 | Resolved: 2018-12-07

## 2018-12-07 PROBLEM — Z12.11 COLON CANCER SCREENING: Status: RESOLVED | Noted: 2018-11-12 | Resolved: 2018-12-07

## 2018-12-07 PROBLEM — K13.21 LEUKOPLAKIA OF TONGUE: Status: RESOLVED | Noted: 2018-02-09 | Resolved: 2018-12-07

## 2018-12-07 PROBLEM — K14.8 TONGUE LESION: Status: RESOLVED | Noted: 2018-02-09 | Resolved: 2018-12-07

## 2018-12-07 PROBLEM — Z13.29 SCREENING FOR HYPOTHYROIDISM: Status: RESOLVED | Noted: 2018-11-12 | Resolved: 2018-12-07

## 2018-12-07 PROCEDURE — 3008F BODY MASS INDEX DOCD: CPT | Performed by: FAMILY MEDICINE

## 2018-12-07 PROCEDURE — 99213 OFFICE O/P EST LOW 20 MIN: CPT | Performed by: FAMILY MEDICINE

## 2018-12-07 PROCEDURE — 3074F SYST BP LT 130 MM HG: CPT | Performed by: FAMILY MEDICINE

## 2018-12-07 PROCEDURE — 3079F DIAST BP 80-89 MM HG: CPT | Performed by: FAMILY MEDICINE

## 2018-12-07 RX ORDER — TRIAMTERENE AND HYDROCHLOROTHIAZIDE 37.5; 25 MG/1; MG/1
1 TABLET ORAL DAILY
Qty: 90 TABLET | Refills: 1 | Status: SHIPPED | OUTPATIENT
Start: 2018-12-07

## 2018-12-07 RX ORDER — NEBIVOLOL 10 MG/1
TABLET ORAL
COMMUNITY
Start: 2018-11-21 | End: 2019-03-08 | Stop reason: ALTCHOICE

## 2018-12-07 NOTE — PATIENT INSTRUCTIONS
DISCUSSED DIETARY MANAGEMENT OF CARBOHYDRATES  ENCOURAGE AN INCREASE IN PHYSICAL ACTIVITY  RV 3 M FOR RE CHECK

## 2018-12-07 NOTE — PROGRESS NOTES
Assessment/Plan:    Problem List Items Addressed This Visit        Other    Elevated blood sugar - Primary          Patient Instructions   DISCUSSED DIETARY MANAGEMENT OF CARBOHYDRATES  ENCOURAGE AN INCREASE IN PHYSICAL ACTIVITY  RV 3 M FOR RE CHECK        Return in about 3 months (around 3/7/2019) for Recheck  Subjective:      Patient ID: Loise Baumgarten is a 57 y o  male  Chief Complaint   Patient presents with    discuss blood work       DISCUSSION    REVIEWED LAB RESULTS  DISCUSSED ELEVATED GLUCOSE AT LENGTH  DISCUSSED THERAPEUTIC OPTIONS          The following portions of the patient's history were reviewed and updated as appropriate: allergies, current medications, past family history, past medical history, past social history, past surgical history and problem list     Review of Systems      Current Outpatient Prescriptions   Medication Sig Dispense Refill    amLODIPine-valsartan (EXFORGE) 5-160 MG per tablet Take 1 tablet by mouth daily      Ascorbic Acid (VITAMIN C) 500 MG CAPS Take 2 tablets by mouth daily      BYSTOLIC 10 MG tablet       choline fenofibrate (TRILIPIX) 135 MG capsule TAKE 1 CAPSULE DAILY      COLCRYS 0 6 MG tablet       cyanocobalamin 1000 MCG tablet Take 100 mcg by mouth daily      Multiple Vitamins-Minerals (CENTRUM ADULTS PO) Take by mouth every morning      nebivolol (BYSTOLIC) 10 mg tablet TAKE 1 TABLET TWICE A DAY      Omega-3 Fatty Acids (FISH OIL) 1200 MG CAPS Take 2 capsules by mouth daily      rosuvastatin (CRESTOR) 20 MG tablet       triamterene-hydrochlorothiazide (MAXZIDE-25) 37 5-25 mg per tablet Take 1 tablet by mouth daily 90 tablet 1     No current facility-administered medications for this visit          Objective:    /80   Pulse 70   Temp 98 4 °F (36 9 °C) (Temporal)   Resp 20   Ht 6' 3" (1 905 m)   Wt 73 5 kg (162 lb)   SpO2 95%   BMI 20 25 kg/m²        Physical Exam       NO PE WAS PERFORMED    LENGTH OF VISIT 15 MIN  LENGTH OF  15 Louann Das MD

## 2019-03-08 ENCOUNTER — OFFICE VISIT (OUTPATIENT)
Dept: FAMILY MEDICINE CLINIC | Facility: CLINIC | Age: 49
End: 2019-03-08
Payer: COMMERCIAL

## 2019-03-08 VITALS
SYSTOLIC BLOOD PRESSURE: 128 MMHG | HEART RATE: 75 BPM | OXYGEN SATURATION: 98 % | RESPIRATION RATE: 12 BRPM | BODY MASS INDEX: 39.17 KG/M2 | TEMPERATURE: 97.4 F | HEIGHT: 75 IN | WEIGHT: 315 LBS | DIASTOLIC BLOOD PRESSURE: 70 MMHG

## 2019-03-08 DIAGNOSIS — I10 ESSENTIAL HYPERTENSION: ICD-10-CM

## 2019-03-08 DIAGNOSIS — E78.2 MIXED HYPERLIPIDEMIA: ICD-10-CM

## 2019-03-08 DIAGNOSIS — E66.01 CLASS 3 SEVERE OBESITY DUE TO EXCESS CALORIES WITH SERIOUS COMORBIDITY AND BODY MASS INDEX (BMI) OF 40.0 TO 44.9 IN ADULT (HCC): ICD-10-CM

## 2019-03-08 DIAGNOSIS — R73.9 ELEVATED BLOOD SUGAR: Primary | ICD-10-CM

## 2019-03-08 PROBLEM — Z00.00 ROUTINE GENERAL MEDICAL EXAMINATION AT A HEALTH CARE FACILITY: Status: RESOLVED | Noted: 2018-11-12 | Resolved: 2019-03-08

## 2019-03-08 LAB — SL AMB POCT HEMOGLOBIN AIC: 5.3 (ref ?–6.5)

## 2019-03-08 PROCEDURE — 3074F SYST BP LT 130 MM HG: CPT | Performed by: FAMILY MEDICINE

## 2019-03-08 PROCEDURE — 99214 OFFICE O/P EST MOD 30 MIN: CPT | Performed by: FAMILY MEDICINE

## 2019-03-08 PROCEDURE — 1036F TOBACCO NON-USER: CPT | Performed by: FAMILY MEDICINE

## 2019-03-08 PROCEDURE — 3078F DIAST BP <80 MM HG: CPT | Performed by: FAMILY MEDICINE

## 2019-03-08 PROCEDURE — 83036 HEMOGLOBIN GLYCOSYLATED A1C: CPT | Performed by: FAMILY MEDICINE

## 2019-03-08 PROCEDURE — 3008F BODY MASS INDEX DOCD: CPT | Performed by: FAMILY MEDICINE

## 2019-03-08 NOTE — PATIENT INSTRUCTIONS
CONTINUE CURRENT TREATMENT PLAN  MONITOR DIETARY SODIUM, CHOL, AND CARBOHYDRATE INTAKE  ENCOURAGE PHYSICAL ACTIVITY  FOOT CARE    RV 6 M, SOONER PRN    Recent Results (from the past 672 hour(s))   POCT hemoglobin A1c    Collection Time: 03/08/19 11:03 AM   Result Value Ref Range    Hemoglobin A1C 5 3 6 5

## 2019-03-08 NOTE — ASSESSMENT & PLAN NOTE
COMPLIANT WITH MEDICATION  DENIES ANY NUMBNESS, TINGLING IN FEET    - DISCUSSED DIETARY MODIFICATION OF CARBOHYDRATES  - HGB A1C  DUE TODAY  - FOOT CARE  - RV 6 MONTHS

## 2019-03-08 NOTE — PROGRESS NOTES
Assessment/Plan:    Problem List Items Addressed This Visit        Cardiovascular and Mediastinum    Essential hypertension     STABLE  DENIES ANY CP, SOB, PALPITATIONS, OR HEADACHE  NOTES NO WATER RETENTION  COMPLIANT WITH MEDICATION  NO CONCERNS    - CONTINUE CURRENT TREATMENT PLAN  - MONITOR DIETARY SODIUM INTAKE  - ENCOURAGE PHYSICAL ACTIVITY  - RV 6 MONTHS              Other    Elevated blood sugar - Primary     COMPLIANT WITH MEDICATION  DENIES ANY NUMBNESS, TINGLING IN FEET    - DISCUSSED DIETARY MODIFICATION OF CARBOHYDRATES  - HGB A1C  DUE TODAY  - FOOT CARE  - RV 6 MONTHS             Relevant Orders    POCT hemoglobin A1c (Completed)    Mixed hyperlipidemia     WATCHING CHOLESTEROL INTAKE  COMPLIANT WITH MEDICATION  NO CONCERNS    - CONTINUE TO MONITOR DIETARY CHOL INTAKE  - CONTINUE CURRENT MEDICATION  - ENCOURAGED PHYSICAL ACTIVITY             Relevant Medications    choline fenofibrate (TRILIPIX) 135 MG capsule    Class 3 severe obesity due to excess calories with serious comorbidity and body mass index (BMI) of 40 0 to 44 9 in adult (HCC)          BMI Counseling: Body mass index is 44 5 kg/m²  Discussed the patient's BMI with him  The BMI is above average  BMI counseling and education was provided to the patient  Nutrition recommendations include decreasing overall calorie intake and moderation in carbohydrate intake  Exercise recommendations include exercising 3-5 times per week  Patient Instructions     CONTINUE CURRENT TREATMENT PLAN  MONITOR DIETARY SODIUM, CHOL, AND CARBOHYDRATE INTAKE  ENCOURAGE PHYSICAL ACTIVITY  FOOT CARE    RV 6 M, SOONER PRN    Recent Results (from the past 672 hour(s))   POCT hemoglobin A1c    Collection Time: 03/08/19 11:03 AM   Result Value Ref Range    Hemoglobin A1C 5 3 6 5             No follow-ups on file  Subjective:      Patient ID: Quique Pichardo is a 57 y o  male      Chief Complaint   Patient presents with    Diabetes       PATIENT RETURNS FOR ROUTINE EVALUATION OF PATIENT'S MEDICAL ISSUES    INDIVIDUAL MEDICAL ISSUES WITH THEIR CURRENT STATUS, ASSESSMENT AND PLANS ARE LISTED ABOVE          The following portions of the patient's history were reviewed and updated as appropriate: allergies, current medications, past family history, past medical history, past social history, past surgical history and problem list     Review of Systems   Constitutional: Negative for chills, fatigue and fever  HENT: Negative for congestion, ear discharge, ear pain, mouth sores, postnasal drip, sore throat and trouble swallowing  Eyes: Negative for pain, discharge and visual disturbance  Respiratory: Negative for cough, shortness of breath and wheezing  Cardiovascular: Negative for chest pain, palpitations and leg swelling  Gastrointestinal: Negative for abdominal distention, abdominal pain, blood in stool, diarrhea and nausea  Endocrine: Negative for polydipsia, polyphagia and polyuria  Genitourinary: Negative for dysuria, frequency, hematuria and urgency  Musculoskeletal: Negative for arthralgias, gait problem and joint swelling  Skin: Negative for pallor and rash  Neurological: Negative for dizziness, syncope, speech difficulty, weakness, light-headedness, numbness and headaches  Hematological: Negative for adenopathy  Psychiatric/Behavioral: Negative for behavioral problems, confusion and sleep disturbance  The patient is not nervous/anxious            Current Outpatient Medications   Medication Sig Dispense Refill    amLODIPine-valsartan (EXFORGE) 5-160 MG per tablet Take 1 tablet by mouth daily      Ascorbic Acid (VITAMIN C) 500 MG CAPS Take 2 tablets by mouth daily      BYSTOLIC 10 MG tablet       choline fenofibrate (TRILIPIX) 135 MG capsule TAKE 1 CAPSULE DAILY      Multiple Vitamins-Minerals (CENTRUM ADULTS PO) Take by mouth every morning      Omega-3 Fatty Acids (FISH OIL) 1200 MG CAPS Take 2 capsules by mouth daily      rosuvastatin (CRESTOR) 20 MG tablet       triamterene-hydrochlorothiazide (MAXZIDE-25) 37 5-25 mg per tablet Take 1 tablet by mouth daily 90 tablet 1    COLCRYS 0 6 MG tablet        No current facility-administered medications for this visit  Objective:    /70 (BP Location: Left arm, Patient Position: Sitting, Cuff Size: Large)   Pulse 75   Temp (!) 97 4 °F (36 3 °C) (Temporal)   Resp 12   Ht 6' 3" (1 905 m)   Wt (!) 161 kg (356 lb)   SpO2 98%   BMI 44 50 kg/m²        Physical Exam   Constitutional: He is oriented to person, place, and time  He appears well-developed and well-nourished  HENT:   Head: Normocephalic and atraumatic  Eyes: Pupils are equal, round, and reactive to light  Conjunctivae and EOM are normal  Right eye exhibits no discharge  Left eye exhibits no discharge  Neck: Neck supple  No JVD present  No thyromegaly present  Cardiovascular: Normal rate, regular rhythm and normal heart sounds  No murmur heard  Pulmonary/Chest: Effort normal and breath sounds normal  He has no wheezes  He has no rales  Abdominal: Soft  Bowel sounds are normal  He exhibits no mass  There is no hepatosplenomegaly  There is no tenderness  There is no rebound, no guarding and no CVA tenderness  MILDLY OBESE     Musculoskeletal: Normal range of motion  He exhibits no edema, tenderness or deformity  Lymphadenopathy:     He has no cervical adenopathy  He has no axillary adenopathy  Neurological: He is alert and oriented to person, place, and time  Skin: Skin is warm and dry  No rash noted  No erythema  Psychiatric: He has a normal mood and affect  His behavior is normal  Judgment and thought content normal               China Nelson MD BMI Counseling: Body mass index is 44 5 kg/m²  Discussed the patient's BMI with him  The BMI is above average  BMI counseling and education was provided to the patient   Nutrition recommendations include decreasing overall calorie intake and moderation in carbohydrate intake  Exercise recommendations include exercising 3-5 times per week

## 2019-07-26 LAB — HBA1C MFR BLD HPLC: 5.5 %

## 2019-11-06 ENCOUNTER — CLINICAL SUPPORT (OUTPATIENT)
Dept: FAMILY MEDICINE CLINIC | Facility: CLINIC | Age: 49
End: 2019-11-06
Payer: COMMERCIAL

## 2019-11-06 DIAGNOSIS — Z13.29 SCREENING FOR HYPOTHYROIDISM: ICD-10-CM

## 2019-11-06 DIAGNOSIS — Z13.220 SCREENING CHOLESTEROL LEVEL: ICD-10-CM

## 2019-11-06 DIAGNOSIS — I10 ESSENTIAL HYPERTENSION: ICD-10-CM

## 2019-11-06 DIAGNOSIS — E78.2 MIXED HYPERLIPIDEMIA: ICD-10-CM

## 2019-11-06 DIAGNOSIS — Z00.00 ROUTINE GENERAL MEDICAL EXAMINATION AT A HEALTH CARE FACILITY: Primary | ICD-10-CM

## 2019-11-06 PROCEDURE — 36415 COLL VENOUS BLD VENIPUNCTURE: CPT

## 2019-11-07 LAB
ALBUMIN SERPL-MCNC: 4.5 G/DL (ref 3.5–5.5)
ALBUMIN/GLOB SERPL: 1.5 {RATIO} (ref 1.2–2.2)
ALP SERPL-CCNC: 54 IU/L (ref 39–117)
ALT SERPL-CCNC: 44 IU/L (ref 0–44)
AST SERPL-CCNC: 53 IU/L (ref 0–40)
BASOPHILS # BLD AUTO: 0 X10E3/UL (ref 0–0.2)
BASOPHILS NFR BLD AUTO: 0 %
BILIRUB SERPL-MCNC: 0.4 MG/DL (ref 0–1.2)
BUN SERPL-MCNC: 10 MG/DL (ref 6–24)
BUN/CREAT SERPL: 11 (ref 9–20)
CALCIUM SERPL-MCNC: 9.4 MG/DL (ref 8.7–10.2)
CHLORIDE SERPL-SCNC: 102 MMOL/L (ref 96–106)
CO2 SERPL-SCNC: 22 MMOL/L (ref 20–29)
CREAT SERPL-MCNC: 0.9 MG/DL (ref 0.76–1.27)
EOSINOPHIL # BLD AUTO: 0.3 X10E3/UL (ref 0–0.4)
EOSINOPHIL NFR BLD AUTO: 6 %
ERYTHROCYTE [DISTWIDTH] IN BLOOD BY AUTOMATED COUNT: 14 % (ref 12.3–15.4)
GLOBULIN SER-MCNC: 3 G/DL (ref 1.5–4.5)
GLUCOSE SERPL-MCNC: 116 MG/DL (ref 65–99)
HCT VFR BLD AUTO: 38.1 % (ref 37.5–51)
HGB BLD-MCNC: 13.6 G/DL (ref 13–17.7)
IMM GRANULOCYTES # BLD: 0 X10E3/UL (ref 0–0.1)
IMM GRANULOCYTES NFR BLD: 0 %
LYMPHOCYTES # BLD AUTO: 1.8 X10E3/UL (ref 0.7–3.1)
LYMPHOCYTES NFR BLD AUTO: 32 %
MCH RBC QN AUTO: 31.3 PG (ref 26.6–33)
MCHC RBC AUTO-ENTMCNC: 35.7 G/DL (ref 31.5–35.7)
MCV RBC AUTO: 88 FL (ref 79–97)
MONOCYTES # BLD AUTO: 0.3 X10E3/UL (ref 0.1–0.9)
MONOCYTES NFR BLD AUTO: 5 %
NEUTROPHILS # BLD AUTO: 3.2 X10E3/UL (ref 1.4–7)
NEUTROPHILS NFR BLD AUTO: 57 %
PLATELET # BLD AUTO: 143 X10E3/UL (ref 150–450)
POTASSIUM SERPL-SCNC: 4 MMOL/L (ref 3.5–5.2)
PROT SERPL-MCNC: 7.5 G/DL (ref 6–8.5)
RBC # BLD AUTO: 4.34 X10E6/UL (ref 4.14–5.8)
SL AMB EGFR AFRICAN AMERICAN: 117 ML/MIN/1.73
SL AMB EGFR NON AFRICAN AMERICAN: 101 ML/MIN/1.73
SODIUM SERPL-SCNC: 141 MMOL/L (ref 134–144)
TSH SERPL DL<=0.005 MIU/L-ACNC: 2.16 UIU/ML (ref 0.45–4.5)
WBC # BLD AUTO: 5.7 X10E3/UL (ref 3.4–10.8)

## 2019-11-08 LAB
CHOLEST SERPL-MCNC: 149 MG/DL (ref 100–199)
CHOLEST/HDLC SERPL: 5.7 RATIO (ref 0–5)
HDLC SERPL-MCNC: 26 MG/DL
LDLC SERPL CALC-MCNC: ABNORMAL MG/DL (ref 0–99)
LDLC SERPL DIRECT ASSAY-MCNC: 48 MG/DL (ref 0–99)
SL AMB VLDL CHOLESTEROL CALC: ABNORMAL MG/DL (ref 5–40)
TRIGL SERPL-MCNC: 500 MG/DL (ref 0–149)

## 2019-11-13 ENCOUNTER — OFFICE VISIT (OUTPATIENT)
Dept: FAMILY MEDICINE CLINIC | Facility: CLINIC | Age: 49
End: 2019-11-13
Payer: COMMERCIAL

## 2019-11-13 VITALS
DIASTOLIC BLOOD PRESSURE: 80 MMHG | HEART RATE: 77 BPM | BODY MASS INDEX: 38.36 KG/M2 | SYSTOLIC BLOOD PRESSURE: 160 MMHG | OXYGEN SATURATION: 98 % | TEMPERATURE: 97.5 F | HEIGHT: 76 IN | RESPIRATION RATE: 16 BRPM | WEIGHT: 315 LBS

## 2019-11-13 DIAGNOSIS — R73.9 ELEVATED BLOOD SUGAR: ICD-10-CM

## 2019-11-13 DIAGNOSIS — Z12.11 COLON CANCER SCREENING: ICD-10-CM

## 2019-11-13 DIAGNOSIS — Z23 NEED FOR INFLUENZA VACCINATION: ICD-10-CM

## 2019-11-13 DIAGNOSIS — Z12.5 ENCOUNTER FOR PROSTATE CANCER SCREENING: ICD-10-CM

## 2019-11-13 DIAGNOSIS — R60.0 LOWER EXTREMITY EDEMA: ICD-10-CM

## 2019-11-13 DIAGNOSIS — Z00.00 ROUTINE GENERAL MEDICAL EXAMINATION AT A HEALTH CARE FACILITY: Primary | ICD-10-CM

## 2019-11-13 DIAGNOSIS — I10 ESSENTIAL HYPERTENSION: ICD-10-CM

## 2019-11-13 DIAGNOSIS — E78.2 MIXED HYPERLIPIDEMIA: ICD-10-CM

## 2019-11-13 DIAGNOSIS — E66.01 CLASS 3 SEVERE OBESITY DUE TO EXCESS CALORIES WITH SERIOUS COMORBIDITY AND BODY MASS INDEX (BMI) OF 40.0 TO 44.9 IN ADULT (HCC): ICD-10-CM

## 2019-11-13 DIAGNOSIS — H00.014 HORDEOLUM EXTERNUM OF LEFT UPPER EYELID: ICD-10-CM

## 2019-11-13 DIAGNOSIS — Z13.29 SCREENING FOR HYPOTHYROIDISM: ICD-10-CM

## 2019-11-13 PROBLEM — M14.679: Status: RESOLVED | Noted: 2018-05-07 | Resolved: 2019-11-13

## 2019-11-13 LAB
HBA1C MFR BLD: 5.6 % (ref 4.8–5.6)
SL AMB  POCT GLUCOSE, UA: 0
SL AMB LEUKOCYTE ESTERASE,UA: 0
SL AMB POCT BILIRUBIN,UA: 0
SL AMB POCT BLOOD,UA: 0
SL AMB POCT CLARITY,UA: CLEAR
SL AMB POCT COLOR,UA: YELLOW
SL AMB POCT FECES OCC BLD: NEGATIVE
SL AMB POCT KETONES,UA: 0
SL AMB POCT NITRITE,UA: 0
SL AMB POCT PH,UA: 6.5
SL AMB POCT SPECIFIC GRAVITY,UA: 1.01
SL AMB POCT URINE PROTEIN: 0
SL AMB POCT UROBILINOGEN: 0

## 2019-11-13 PROCEDURE — 93000 ELECTROCARDIOGRAM COMPLETE: CPT | Performed by: FAMILY MEDICINE

## 2019-11-13 PROCEDURE — 82270 OCCULT BLOOD FECES: CPT | Performed by: FAMILY MEDICINE

## 2019-11-13 PROCEDURE — 90471 IMMUNIZATION ADMIN: CPT

## 2019-11-13 PROCEDURE — 99396 PREV VISIT EST AGE 40-64: CPT | Performed by: FAMILY MEDICINE

## 2019-11-13 PROCEDURE — 90682 RIV4 VACC RECOMBINANT DNA IM: CPT

## 2019-11-13 PROCEDURE — 81003 URINALYSIS AUTO W/O SCOPE: CPT | Performed by: FAMILY MEDICINE

## 2019-11-13 RX ORDER — TORSEMIDE 20 MG/1
20 TABLET ORAL DAILY
COMMUNITY
Start: 2019-07-26 | End: 2022-04-19

## 2019-11-13 RX ORDER — CEPHALEXIN 500 MG/1
500 CAPSULE ORAL EVERY 12 HOURS SCHEDULED
Qty: 14 CAPSULE | Refills: 0 | Status: SHIPPED | OUTPATIENT
Start: 2019-11-13 | End: 2019-11-20

## 2019-11-13 NOTE — PATIENT INSTRUCTIONS
DISCUSSED HEALTH MAINTENENCE ISSUES  BW WILL BE OBTAINED  ENCOURAGED HEALTHY DIET AND EXERCISE  COLONOSCOPY WILL BE ORDERED  RV FOR ANNUAL HEALTH EXAM IN 1 YEAR  RV SOONER IF THERE ARE ANY CONCERNS      Recent Results (from the past 672 hour(s))   CBC and differential    Collection Time: 11/06/19  9:41 AM   Result Value Ref Range    White Blood Cell Count 5 7 3 4 - 10 8 x10E3/uL    Red Blood Cell Count 4 34 4 14 - 5 80 x10E6/uL    Hemoglobin 13 6 13 0 - 17 7 g/dL    HCT 38 1 37 5 - 51 0 %    MCV 88 79 - 97 fL    MCH 31 3 26 6 - 33 0 pg    MCHC 35 7 31 5 - 35 7 g/dL    RDW 14 0 12 3 - 15 4 %    Platelet Count 372 (L) 150 - 450 x10E3/uL    Neutrophils 57 Not Estab  %    Lymphocytes 32 Not Estab  %    Monocytes 5 Not Estab  %    Eosinophils 6 Not Estab  %    Basophils PCT 0 Not Estab  %    Neutrophils (Absolute) 3 2 1 4 - 7 0 x10E3/uL    Lymphocytes (Absolute) 1 8 0 7 - 3 1 x10E3/uL    Monocytes (Absolute) 0 3 0 1 - 0 9 x10E3/uL    Eosinophils (Absolute) 0 3 0 0 - 0 4 x10E3/uL    Basophils ABS 0 0 0 0 - 0 2 x10E3/uL    Immature Granulocytes 0 Not Estab  %    Immature Granulocytes (Absolute) 0 0 0 0 - 0 1 x10E3/uL   Lipid panel    Collection Time: 11/06/19  9:41 AM   Result Value Ref Range    Cholesterol, Total 149 100 - 199 mg/dL    Triglycerides 500 (H) 0 - 149 mg/dL    HDL 26 (L) >39 mg/dL    VLDL Cholesterol Calculated Comment 5 - 40 mg/dL    LDL Direct Comment 0 - 99 mg/dL    T   Chol/HDL Ratio 5 7 (H) 0 0 - 5 0 ratio   TSH, 3rd generation    Collection Time: 11/06/19  9:41 AM   Result Value Ref Range    TSH 2 160 0 450 - 4 500 uIU/mL   Comprehensive metabolic panel    Collection Time: 11/06/19  9:41 AM   Result Value Ref Range    Glucose, Random 116 (H) 65 - 99 mg/dL    BUN 10 6 - 24 mg/dL    Creatinine 0 90 0 76 - 1 27 mg/dL    eGFR Non African American 101 >59 mL/min/1 73    eGFR  117 >59 mL/min/1 73    SL AMB BUN/CREATININE RATIO 11 9 - 20    Sodium 141 134 - 144 mmol/L    Potassium 4 0 3 5 - 5 2 mmol/L    Chloride 102 96 - 106 mmol/L    CO2 22 20 - 29 mmol/L    CALCIUM 9 4 8 7 - 10 2 mg/dL    Protein, Total 7 5 6 0 - 8 5 g/dL    Albumin 4 5 3 5 - 5 5 g/dL    Globulin, Total 3 0 1 5 - 4 5 g/dL    Albumin/Globulin Ratio 1 5 1 2 - 2 2    TOTAL BILIRUBIN 0 4 0 0 - 1 2 mg/dL    Alk Phos Isoenzymes 54 39 - 117 IU/L    AST 53 (H) 0 - 40 IU/L    ALT 44 0 - 44 IU/L   LDL cholesterol, direct    Collection Time: 11/06/19  9:41 AM   Result Value Ref Range    LDL Cholesterol 48 0 - 99 mg/dL   Hemoglobin A1c (w/out EAG)    Collection Time: 11/06/19  9:41 AM   Result Value Ref Range    Hemoglobin A1C 5 6 4 8 - 5 6 %

## 2019-11-13 NOTE — PROGRESS NOTES
FAMILY PRACTICE HEALTH MAINTENANCE OFFICE VISIT  [facility]    NAME: Nicole Duggan  AGE: 54 y o  SEX: male  : 1970     DATE: 2019    Assessment and Plan     Problem List Items Addressed This Visit        Cardiovascular and Mediastinum    Essential hypertension    Relevant Medications    torsemide (DEMADEX) 20 mg tablet    Other Relevant Orders    POCT urine dip auto non-scope (Completed)    POCT ECG (Completed)       Other    Elevated blood sugar    Mixed hyperlipidemia    Lower extremity edema    Class 3 severe obesity due to excess calories with serious comorbidity and body mass index (BMI) of 40 0 to 44 9 in adult [facility])    Encounter for prostate cancer screening    Colon cancer screening    Relevant Orders    POCT hemoccult screening (Completed)    Screening for hypothyroidism    Routine general medical examination at a health care facility - Primary      Other Visit Diagnoses     Need for influenza vaccination        Relevant Orders    influenza vaccine, [id number], quadrivalent, recombinant, PF, 0 5 mL, for patients 18 yr+ (FLUBLOK) (Completed)               Return in about 6 months (around 2020) for    Recheck  BP  Chief Complaint     Chief Complaint   Patient presents with    Annual Exam       History of Present Illness     [address] RECORD  NO CONCERNS AT THIS TIME        Well Adult Physical   Patient here for a comprehensive physical exam       Diet and Physical Activity  Diet: well balanced diet  Weight concerns: Patient has class 2 obesity (BMI 35 0-39  9)  Exercise: infrequently      Depression Screen  PHQ-9 Depression Screening    PHQ-9:    Frequency of the following problems over the past two weeks:       Little interest or pleasure in doing things:  0 - not at all  Feeling down, depressed, or hopeless:  0 - not at all  PHQ-2 Score:  0          General Health  Hearing: Normal: bilateral  Vision: no vision problems  Dental: regular dental visits    Reproductive Health          The following portions of the patient's history were reviewed and updated as appropriate: allergies, current medications, past family history, past medical history, past social history, past surgical history and problem list     Review of Systems     Review of Systems   Constitutional: Negative for chills, fatigue and fever  HENT: Negative for congestion, ear discharge, ear pain, mouth sores, postnasal drip, sore throat and trouble swallowing  Eyes: Negative for pain, discharge and visual disturbance  Respiratory: Negative for cough, shortness of breath and wheezing  Cardiovascular: Negative for chest pain, palpitations and leg swelling  Gastrointestinal: Negative for abdominal distention, abdominal pain, blood in stool, diarrhea and nausea  Endocrine: Negative for polydipsia, polyphagia and polyuria  Genitourinary: Negative for dysuria, frequency, hematuria and urgency  Musculoskeletal: Positive for arthralgias  Negative for gait problem and joint swelling  Skin: Negative for pallor and rash  Neurological: Negative for dizziness, syncope, speech difficulty, weakness, light-headedness, numbness and headaches  Hematological: Negative for adenopathy  Psychiatric/Behavioral: Negative for behavioral problems, confusion and sleep disturbance  The patient is not nervous/anxious          Past Medical History     Past Medical History:   Diagnosis Date    Hemorrhoids     last assessed-9/12/2014    Hyperlipidemia     last assessed-11/7/2017    Hypertension     Obesity     PONV (postoperative nausea and vomiting)     this was with a brother       Past Surgical History     Past Surgical History:   Procedure Laterality Date    HAND FRACTURE REPAIR Left     repait 1990    AR REPAIR INCISIONAL HERNIA,REDUCIBLE N/A 10/9/2017    Procedure: REPAIR HERNIA VENTRAL WITH MESH;  Surgeon: Akil Iniguez MD; Location: [facility];  Service: General    VASECTOMY      surgery vas deferens vasectomy       Social History     Social History     Socioeconomic History    Marital status: /Civil Union     Spouse name: None    Number of children: None    Years of education: None    Highest education level: None   Occupational History    None   Social Needs    Financial resource strain: None    Food insecurity:     Worry: None     Inability: None    Transportation needs:     Medical: None     Non-medical: None   Tobacco Use    Smoking status: Never Smoker    Smokeless tobacco: Former User   Substance and Sexual Activity    Alcohol use: Yes     Comment: socially 4x week        Drug use: No    Sexual activity: Yes   Lifestyle    Physical activity:     Days per week: None     Minutes per session: None    Stress: None   Relationships    Social connections:     Talks on phone: None     Gets together: None     Attends Yazdanism service: None     Active member of club or organization: None     Attends meetings of clubs or organizations: None     Relationship status: None    Intimate partner violence:     Fear of current or ex partner: None     Emotionally abused: None     Physically abused: None     Forced sexual activity: None   Other Topics Concern    None   Social History Narrative    Dental care, regularly    No caffeine use       Family History     Family History   Problem Relation Age of Onset    Heart disease Mother         cardiac disorder    Heart failure Mother         CHF    Melanoma Mother     Heart disease Father         cardiac disorder    Heart disease Brother         heart artery stent    Substance Abuse Neg Hx     Mental illness Neg Hx        Current Medications       Current Outpatient Medications:     amLODIPine-valsartan (EXFORGE) 5-160 MG per tablet, Take 1 tablet by mouth daily, Disp: , Rfl:     Ascorbic Acid (VITAMIN C) 500 MG CAPS, Take 2 tablets by mouth daily, Disp: , Rfl:    BYSTOLIC 10 MG tablet, , Disp: , Rfl:     choline fenofibrate (TRILIPIX) 135 MG capsule, TAKE 1 CAPSULE DAILY, Disp: , Rfl:     Multiple Vitamins-Minerals (CENTRUM ADULTS PO), Take by mouth every morning, Disp: , Rfl:     Omega-3 Fatty Acids (FISH OIL) 1200 MG CAPS, Take 2 capsules by mouth daily, Disp: , Rfl:     rosuvastatin (CRESTOR) 20 MG tablet, , Disp: , Rfl:     torsemide (DEMADEX) 20 mg tablet, Take 20 mg by mouth daily, Disp: , Rfl:     triamterene-hydrochlorothiazide (MAXZIDE-25) 37 5-25 mg per tablet, Take 1 tablet by mouth daily, Disp: 90 tablet, Rfl: 1    COLCRYS 0 6 MG tablet, , Disp: , Rfl:      Allergies     No Known Allergies    Objective     /80 (BP Location: Left arm, Patient Position: Sitting, Cuff Size: Large)   Pulse 77   Temp 97 5 °F (36 4 °C) (Temporal)   Resp 16   Ht 6' 3 75" (1 924 m)   Wt (!) 164 kg (362 lb)   SpO2 98%   BMI 44 36 kg/m²      Physical Exam   Constitutional: He is oriented to person, place, and time  He appears well-developed and well-nourished  HENT:   Head: Normocephalic and atraumatic  Right Ear: External ear normal    Left Ear: External ear normal    Nose: Nose normal    Mouth/Throat: Oropharynx is clear and moist    Eyes: Pupils are equal, round, and reactive to light  Conjunctivae and EOM are normal  Right eye exhibits no discharge  Left eye exhibits no discharge  Neck: Neck supple  No JVD present  No thyromegaly present  Cardiovascular: Normal rate, regular rhythm, normal heart sounds and intact distal pulses  No murmur heard  Pulmonary/Chest: Effort normal and breath sounds normal  He has no wheezes  He has no rales  Abdominal: Soft  Bowel sounds are normal  He exhibits no mass  There is no hepatosplenomegaly  There is no tenderness  There is no rebound, no guarding and no CVA tenderness  Genitourinary: Rectum normal, prostate normal and penis normal  Rectal exam shows guaiac negative stool     Musculoskeletal: Normal range of motion  He exhibits no edema, tenderness or deformity  Lymphadenopathy:     He has no cervical adenopathy  He has no axillary adenopathy  Neurological: He is alert and oriented to person, place, and time  He has normal reflexes  No cranial nerve deficit  He exhibits normal muscle tone  Coordination normal    Skin: Skin is warm and dry  No rash noted  No erythema  Psychiatric: He has a normal mood and affect   His behavior is normal  Judgment and thought content normal          No exam data present    Health Maintenance     Health Maintenance   Topic Date Due    HIV Screening  07/31/1987    DTaP,Tdap,and Td Vaccines (1 - Tdap) 10/19/2010    INFLUENZA VACCINE  07/01/2019    BMI: Followup Plan  03/08/2020    BMI: Adult  03/08/2020    Depression Screening PHQ  11/13/2020    Pneumococcal Vaccine: 65+ Years (1 of 2 - PCV13) 07/31/2037    Pneumococcal Vaccine: Pediatrics (0 to 5 Years) and At-Risk Patients (6 to 59 Years)  Aged Out    HEPATITIS B VACCINES  Aged Out     Immunization History   Administered Date(s) Administered    Influenza Quadrivalent Preservative Free ID 10/28/2016    Influenza Quadrivalent, 6-35 Months IM 11/07/2017    Influenza, injectable, quadrivalent, preservative free 0 5 mL 11/12/2018    Influenza, recombinant, quadrivalent,injectable, preservative free 11/13/2019    Td (adult), adsorbed 10/18/2010       Vikki Martin MD  [facility]

## 2019-11-13 NOTE — LETTER
JAS ZAVALA    Recent Results (from the past 672 hour(s))   CBC and differential    Collection Time: 11/06/19  9:41 AM   Result Value Ref Range    White Blood Cell Count 5 7 3 4 - 10 8 x10E3/uL    Red Blood Cell Count 4 34 4 14 - 5 80 x10E6/uL    Hemoglobin 13 6 13 0 - 17 7 g/dL    HCT 38 1 37 5 - 51 0 %    MCV 88 79 - 97 fL    MCH 31 3 26 6 - 33 0 pg    MCHC 35 7 31 5 - 35 7 g/dL    RDW 14 0 12 3 - 15 4 %    Platelet Count 732 (L) 150 - 450 x10E3/uL    Neutrophils 57 Not Estab  %    Lymphocytes 32 Not Estab  %    Monocytes 5 Not Estab  %    Eosinophils 6 Not Estab  %    Basophils PCT 0 Not Estab  %    Neutrophils (Absolute) 3 2 1 4 - 7 0 x10E3/uL    Lymphocytes (Absolute) 1 8 0 7 - 3 1 x10E3/uL    Monocytes (Absolute) 0 3 0 1 - 0 9 x10E3/uL    Eosinophils (Absolute) 0 3 0 0 - 0 4 x10E3/uL    Basophils ABS 0 0 0 0 - 0 2 x10E3/uL    Immature Granulocytes 0 Not Estab  %    Immature Granulocytes (Absolute) 0 0 0 0 - 0 1 x10E3/uL   Lipid panel    Collection Time: 11/06/19  9:41 AM   Result Value Ref Range    Cholesterol, Total 149 100 - 199 mg/dL    Triglycerides 500 (H) 0 - 149 mg/dL    HDL 26 (L) >39 mg/dL    VLDL Cholesterol Calculated Comment 5 - 40 mg/dL    LDL Direct Comment 0 - 99 mg/dL    T   Chol/HDL Ratio 5 7 (H) 0 0 - 5 0 ratio   TSH, 3rd generation    Collection Time: 11/06/19  9:41 AM   Result Value Ref Range    TSH 2 160 0 450 - 4 500 uIU/mL   Comprehensive metabolic panel    Collection Time: 11/06/19  9:41 AM   Result Value Ref Range    Glucose, Random 116 (H) 65 - 99 mg/dL    BUN 10 6 - 24 mg/dL    Creatinine 0 90 0 76 - 1 27 mg/dL    eGFR Non African American 101 >59 mL/min/1 73    eGFR  117 >59 mL/min/1 73    SL AMB BUN/CREATININE RATIO 11 9 - 20    Sodium 141 134 - 144 mmol/L    Potassium 4 0 3 5 - 5 2 mmol/L    Chloride 102 96 - 106 mmol/L    CO2 22 20 - 29 mmol/L    CALCIUM 9 4 8 7 - 10 2 mg/dL    Protein, Total 7 5 6 0 - 8 5 g/dL    Albumin 4 5 3 5 - 5 5 g/dL    Globulin, Total 3 0 1 5 - 4 5 g/dL    Albumin/Globulin Ratio 1 5 1 2 - 2 2    TOTAL BILIRUBIN 0 4 0 0 - 1 2 mg/dL    Alk Phos Isoenzymes 54 39 - 117 IU/L    AST 53 (H) 0 - 40 IU/L    ALT 44 0 - 44 IU/L   LDL cholesterol, direct    Collection Time: 11/06/19  9:41 AM   Result Value Ref Range    LDL Cholesterol 48 0 - 99 mg/dL   Hemoglobin A1c (w/out EAG)    Collection Time: 11/06/19  9:41 AM   Result Value Ref Range    Hemoglobin A1C 5 6 4 8 - 5 6 %

## 2020-05-13 ENCOUNTER — TELEMEDICINE (OUTPATIENT)
Dept: FAMILY MEDICINE CLINIC | Facility: CLINIC | Age: 50
End: 2020-05-13
Payer: COMMERCIAL

## 2020-05-13 DIAGNOSIS — I10 ESSENTIAL HYPERTENSION: Primary | ICD-10-CM

## 2020-05-13 DIAGNOSIS — R73.9 ELEVATED BLOOD SUGAR: ICD-10-CM

## 2020-05-13 DIAGNOSIS — K75.81 NASH (NONALCOHOLIC STEATOHEPATITIS): ICD-10-CM

## 2020-05-13 DIAGNOSIS — E78.2 MIXED HYPERLIPIDEMIA: ICD-10-CM

## 2020-05-13 PROCEDURE — 99214 OFFICE O/P EST MOD 30 MIN: CPT | Performed by: FAMILY MEDICINE

## 2020-10-20 DIAGNOSIS — Z00.00 ROUTINE GENERAL MEDICAL EXAMINATION AT A HEALTH CARE FACILITY: Primary | ICD-10-CM

## 2020-10-20 DIAGNOSIS — Z12.5 ENCOUNTER FOR PROSTATE CANCER SCREENING: ICD-10-CM

## 2020-10-20 DIAGNOSIS — Z11.4 SCREENING FOR HIV (HUMAN IMMUNODEFICIENCY VIRUS): ICD-10-CM

## 2020-10-20 DIAGNOSIS — Z13.29 SCREENING FOR HYPOTHYROIDISM: ICD-10-CM

## 2020-10-20 DIAGNOSIS — I10 ESSENTIAL HYPERTENSION: ICD-10-CM

## 2020-10-20 DIAGNOSIS — E78.2 MIXED HYPERLIPIDEMIA: ICD-10-CM

## 2020-11-10 LAB
ALBUMIN SERPL-MCNC: 4.6 G/DL (ref 4–5)
ALBUMIN/GLOB SERPL: 1.5 {RATIO} (ref 1.2–2.2)
ALP SERPL-CCNC: 60 IU/L (ref 39–117)
ALT SERPL-CCNC: 38 IU/L (ref 0–44)
AST SERPL-CCNC: 57 IU/L (ref 0–40)
BASOPHILS # BLD AUTO: 0.1 X10E3/UL (ref 0–0.2)
BASOPHILS NFR BLD AUTO: 1 %
BILIRUB SERPL-MCNC: 0.4 MG/DL (ref 0–1.2)
BUN SERPL-MCNC: 12 MG/DL (ref 6–24)
BUN/CREAT SERPL: 14 (ref 9–20)
CALCIUM SERPL-MCNC: 9.4 MG/DL (ref 8.7–10.2)
CHLORIDE SERPL-SCNC: 99 MMOL/L (ref 96–106)
CHOLEST SERPL-MCNC: 154 MG/DL (ref 100–199)
CHOLEST/HDLC SERPL: 6.2 RATIO (ref 0–5)
CO2 SERPL-SCNC: 23 MMOL/L (ref 20–29)
CREAT SERPL-MCNC: 0.88 MG/DL (ref 0.76–1.27)
EOSINOPHIL # BLD AUTO: 0.4 X10E3/UL (ref 0–0.4)
EOSINOPHIL NFR BLD AUTO: 6 %
ERYTHROCYTE [DISTWIDTH] IN BLOOD BY AUTOMATED COUNT: 13.2 % (ref 11.6–15.4)
GLOBULIN SER-MCNC: 3 G/DL (ref 1.5–4.5)
GLUCOSE SERPL-MCNC: 115 MG/DL (ref 65–99)
HCT VFR BLD AUTO: 42.2 % (ref 37.5–51)
HDLC SERPL-MCNC: 25 MG/DL
HGB BLD-MCNC: 14.3 G/DL (ref 13–17.7)
HIV 1+2 AB+HIV1 P24 AG SERPL QL IA: NON REACTIVE
IMM GRANULOCYTES # BLD: 0 X10E3/UL (ref 0–0.1)
IMM GRANULOCYTES NFR BLD: 0 %
LDLC SERPL CALC-MCNC: 48 MG/DL (ref 0–99)
LDLC SERPL DIRECT ASSAY-MCNC: 51 MG/DL (ref 0–99)
LYMPHOCYTES # BLD AUTO: 2.1 X10E3/UL (ref 0.7–3.1)
LYMPHOCYTES NFR BLD AUTO: 31 %
MCH RBC QN AUTO: 31.8 PG (ref 26.6–33)
MCHC RBC AUTO-ENTMCNC: 33.9 G/DL (ref 31.5–35.7)
MCV RBC AUTO: 94 FL (ref 79–97)
MONOCYTES # BLD AUTO: 0.4 X10E3/UL (ref 0.1–0.9)
MONOCYTES NFR BLD AUTO: 6 %
NEUTROPHILS # BLD AUTO: 4 X10E3/UL (ref 1.4–7)
NEUTROPHILS NFR BLD AUTO: 56 %
PLATELET # BLD AUTO: 160 X10E3/UL (ref 150–450)
POTASSIUM SERPL-SCNC: 4.4 MMOL/L (ref 3.5–5.2)
PROT SERPL-MCNC: 7.6 G/DL (ref 6–8.5)
PSA SERPL-MCNC: 0.4 NG/ML (ref 0–4)
RBC # BLD AUTO: 4.49 X10E6/UL (ref 4.14–5.8)
SL AMB EGFR AFRICAN AMERICAN: 117 ML/MIN/1.73
SL AMB EGFR NON AFRICAN AMERICAN: 102 ML/MIN/1.73
SL AMB REFLEX CRITERIA: NORMAL
SL AMB VLDL CHOLESTEROL CALC: 81 MG/DL (ref 5–40)
SODIUM SERPL-SCNC: 139 MMOL/L (ref 134–144)
TRIGL SERPL-MCNC: 558 MG/DL (ref 0–149)
TSH SERPL DL<=0.005 MIU/L-ACNC: 2.54 UIU/ML (ref 0.45–4.5)
WBC # BLD AUTO: 7 X10E3/UL (ref 3.4–10.8)

## 2020-11-16 ENCOUNTER — OFFICE VISIT (OUTPATIENT)
Dept: FAMILY MEDICINE CLINIC | Facility: CLINIC | Age: 50
End: 2020-11-16
Payer: COMMERCIAL

## 2020-11-16 VITALS
OXYGEN SATURATION: 97 % | RESPIRATION RATE: 16 BRPM | WEIGHT: 315 LBS | TEMPERATURE: 97 F | BODY MASS INDEX: 38.36 KG/M2 | DIASTOLIC BLOOD PRESSURE: 70 MMHG | SYSTOLIC BLOOD PRESSURE: 156 MMHG | HEIGHT: 76 IN | HEART RATE: 70 BPM

## 2020-11-16 DIAGNOSIS — Z23 NEED FOR TDAP VACCINATION: ICD-10-CM

## 2020-11-16 DIAGNOSIS — Z13.29 SCREENING FOR HYPOTHYROIDISM: ICD-10-CM

## 2020-11-16 DIAGNOSIS — Z12.11 COLON CANCER SCREENING: ICD-10-CM

## 2020-11-16 DIAGNOSIS — E78.2 MIXED HYPERLIPIDEMIA: ICD-10-CM

## 2020-11-16 DIAGNOSIS — Z12.5 ENCOUNTER FOR PROSTATE CANCER SCREENING: ICD-10-CM

## 2020-11-16 DIAGNOSIS — R73.9 ELEVATED BLOOD SUGAR: ICD-10-CM

## 2020-11-16 DIAGNOSIS — Z00.00 ROUTINE GENERAL MEDICAL EXAMINATION AT A HEALTH CARE FACILITY: Primary | ICD-10-CM

## 2020-11-16 DIAGNOSIS — E66.01 CLASS 3 SEVERE OBESITY DUE TO EXCESS CALORIES WITH SERIOUS COMORBIDITY AND BODY MASS INDEX (BMI) OF 40.0 TO 44.9 IN ADULT (HCC): ICD-10-CM

## 2020-11-16 DIAGNOSIS — I10 ESSENTIAL HYPERTENSION: ICD-10-CM

## 2020-11-16 DIAGNOSIS — Z23 NEED FOR INFLUENZA VACCINATION: ICD-10-CM

## 2020-11-16 LAB
SL AMB POCT FECES OCC BLD: NORMAL
SL AMB POCT HEMOGLOBIN AIC: 5.4 (ref ?–6.5)

## 2020-11-16 PROCEDURE — 3077F SYST BP >= 140 MM HG: CPT

## 2020-11-16 PROCEDURE — 82270 OCCULT BLOOD FECES: CPT

## 2020-11-16 PROCEDURE — 3725F SCREEN DEPRESSION PERFORMED: CPT

## 2020-11-16 PROCEDURE — 90472 IMMUNIZATION ADMIN EACH ADD: CPT

## 2020-11-16 PROCEDURE — 83036 HEMOGLOBIN GLYCOSYLATED A1C: CPT

## 2020-11-16 PROCEDURE — 3008F BODY MASS INDEX DOCD: CPT

## 2020-11-16 PROCEDURE — 1036F TOBACCO NON-USER: CPT

## 2020-11-16 PROCEDURE — 90471 IMMUNIZATION ADMIN: CPT

## 2020-11-16 PROCEDURE — 99396 PREV VISIT EST AGE 40-64: CPT

## 2020-11-16 PROCEDURE — 90686 IIV4 VACC NO PRSV 0.5 ML IM: CPT

## 2020-11-16 PROCEDURE — 90715 TDAP VACCINE 7 YRS/> IM: CPT

## 2020-11-16 PROCEDURE — 3078F DIAST BP <80 MM HG: CPT

## 2020-12-29 ENCOUNTER — TELEMEDICINE (OUTPATIENT)
Dept: FAMILY MEDICINE CLINIC | Facility: CLINIC | Age: 50
End: 2020-12-29
Payer: COMMERCIAL

## 2020-12-29 VITALS — HEIGHT: 76 IN | BODY MASS INDEX: 38.36 KG/M2 | TEMPERATURE: 99.9 F | WEIGHT: 315 LBS

## 2020-12-29 DIAGNOSIS — U07.1 COVID-19 VIRUS INFECTION: Primary | ICD-10-CM

## 2020-12-29 PROCEDURE — 3008F BODY MASS INDEX DOCD: CPT

## 2020-12-29 PROCEDURE — 99214 OFFICE O/P EST MOD 30 MIN: CPT | Performed by: NURSE PRACTITIONER

## 2021-01-04 DIAGNOSIS — U07.1 COVID-19 VIRUS INFECTION: Primary | ICD-10-CM

## 2021-01-04 DIAGNOSIS — R05.9 COUGH: ICD-10-CM

## 2021-01-06 DIAGNOSIS — R05.9 COUGH: ICD-10-CM

## 2021-01-06 DIAGNOSIS — U07.1 COVID-19 VIRUS INFECTION: ICD-10-CM

## 2021-01-06 PROCEDURE — U0005 INFEC AGEN DETEC AMPLI PROBE: HCPCS | Performed by: NURSE PRACTITIONER

## 2021-01-06 PROCEDURE — U0003 INFECTIOUS AGENT DETECTION BY NUCLEIC ACID (DNA OR RNA); SEVERE ACUTE RESPIRATORY SYNDROME CORONAVIRUS 2 (SARS-COV-2) (CORONAVIRUS DISEASE [COVID-19]), AMPLIFIED PROBE TECHNIQUE, MAKING USE OF HIGH THROUGHPUT TECHNOLOGIES AS DESCRIBED BY CMS-2020-01-R: HCPCS | Performed by: NURSE PRACTITIONER

## 2021-01-07 LAB — SARS-COV-2 RNA SPEC QL NAA+PROBE: NOT DETECTED

## 2021-03-03 ENCOUNTER — OFFICE VISIT (OUTPATIENT)
Dept: FAMILY MEDICINE CLINIC | Facility: CLINIC | Age: 51
End: 2021-03-03
Payer: COMMERCIAL

## 2021-03-03 VITALS
TEMPERATURE: 98 F | HEART RATE: 70 BPM | SYSTOLIC BLOOD PRESSURE: 164 MMHG | RESPIRATION RATE: 18 BRPM | DIASTOLIC BLOOD PRESSURE: 66 MMHG | BODY MASS INDEX: 38.36 KG/M2 | HEIGHT: 76 IN | OXYGEN SATURATION: 97 % | WEIGHT: 315 LBS

## 2021-03-03 DIAGNOSIS — R10.12 LEFT UPPER QUADRANT ABDOMINAL PAIN: Primary | ICD-10-CM

## 2021-03-03 LAB
SL AMB  POCT GLUCOSE, UA: 0
SL AMB LEUKOCYTE ESTERASE,UA: 0
SL AMB POCT BILIRUBIN,UA: 0
SL AMB POCT BLOOD,UA: 0
SL AMB POCT CLARITY,UA: CLEAR
SL AMB POCT COLOR,UA: YELLOW
SL AMB POCT KETONES,UA: 0
SL AMB POCT NITRITE,UA: 0
SL AMB POCT PH,UA: 6
SL AMB POCT SPECIFIC GRAVITY,UA: 1
SL AMB POCT URINE PROTEIN: 0
SL AMB POCT UROBILINOGEN: 0

## 2021-03-03 PROCEDURE — 99213 OFFICE O/P EST LOW 20 MIN: CPT | Performed by: FAMILY MEDICINE

## 2021-03-03 PROCEDURE — 81003 URINALYSIS AUTO W/O SCOPE: CPT | Performed by: FAMILY MEDICINE

## 2021-03-03 PROCEDURE — 3078F DIAST BP <80 MM HG: CPT | Performed by: FAMILY MEDICINE

## 2021-03-03 PROCEDURE — 1036F TOBACCO NON-USER: CPT | Performed by: FAMILY MEDICINE

## 2021-03-03 PROCEDURE — 3008F BODY MASS INDEX DOCD: CPT | Performed by: FAMILY MEDICINE

## 2021-03-03 PROCEDURE — 3077F SYST BP >= 140 MM HG: CPT | Performed by: FAMILY MEDICINE

## 2021-03-03 RX ORDER — NAPROXEN 500 MG/1
500 TABLET ORAL 2 TIMES DAILY WITH MEALS
Qty: 28 TABLET | Refills: 1 | Status: SHIPPED | OUTPATIENT
Start: 2021-03-03 | End: 2021-11-22

## 2021-03-03 NOTE — PATIENT INSTRUCTIONS
PLENTY OF FLUIDS  REST  US AND RIB SERIES  TRIAL OF NAPROSYN    FURTHER PLANS PENDING RESPONSE TO TREATMENT AND EVALUATION

## 2021-03-03 NOTE — PROGRESS NOTES
BMI Counseling: Body mass index is 45 83 kg/m²  The BMI is above normal  Nutrition recommendations include encouraging healthy choices of fruits and vegetables and moderation in carbohydrate intake  Exercise recommendations include exercising 3-5 times per week  No pharmacotherapy was ordered  Assessment/Plan:    No problem-specific Assessment & Plan notes found for this encounter  Diagnoses and all orders for this visit:    Left upper quadrant abdominal pain  -     POCT urine dip auto non-scope  -     XR ribs left w pa chest min 3 views; Future  -     US abdomen complete; Future  -     naproxen (NAPROSYN) 500 mg tablet; Take 1 tablet (500 mg total) by mouth 2 (two) times a day with meals    Other orders  -     Multiple Vitamins-Minerals (ZINC PO); Take by mouth daily          Patient Instructions   PLENTY OF FLUIDS  REST  US AND RIB SERIES  TRIAL OF NAPROSYN    FURTHER PLANS PENDING RESPONSE TO TREATMENT AND EVALUATION      Return if symptoms worsen or fail to improve, for Next scheduled follow up  Subjective:      Patient ID: Javi Paredes is a 52 y o  male  Chief Complaint   Patient presents with    Lt rib pain       PATIENT COMPLAINS OF L RIB / SIDE DISCOMFORT X PAST MONTH  WORSE WHEN IN THE TRUCK PLOWING  SL DISCOMFORT WITH DEEP INSPIRATION  DENIES ANY COUGH, CONGESTION  NO FEVER OR CHILLS  NO NVD  DENIES ANY RASH    FAMILY CONCERNED  - FHX OF PANCREATIC CANCER      The following portions of the patient's history were reviewed and updated as appropriate: allergies, current medications, past family history, past medical history, past social history, past surgical history and problem list     Review of Systems   Constitutional: Negative for chills, fatigue and fever  HENT: Negative for congestion, ear discharge, ear pain, mouth sores, postnasal drip, sore throat and trouble swallowing  Eyes: Negative for pain, discharge and visual disturbance     Respiratory: Negative for cough, shortness of breath and wheezing  Cardiovascular: Negative for chest pain, palpitations and leg swelling  Gastrointestinal: Negative for abdominal distention, abdominal pain, blood in stool, diarrhea and nausea  Endocrine: Negative for polydipsia, polyphagia and polyuria  Genitourinary: Negative for dysuria, frequency, hematuria and urgency  Musculoskeletal: Positive for arthralgias and back pain  Negative for gait problem and joint swelling  Skin: Negative for pallor and rash  Neurological: Negative for dizziness, syncope, speech difficulty, weakness, light-headedness, numbness and headaches  Hematological: Negative for adenopathy  Psychiatric/Behavioral: Negative for behavioral problems, confusion and sleep disturbance  The patient is not nervous/anxious  Current Outpatient Medications   Medication Sig Dispense Refill    amLODIPine-valsartan (EXFORGE) 5-160 MG per tablet Take 1 tablet by mouth daily      Ascorbic Acid (VITAMIN C) 500 MG CAPS Take 2 tablets by mouth daily      BYSTOLIC 10 MG tablet       choline fenofibrate (TRILIPIX) 135 MG capsule TAKE 1 CAPSULE DAILY      Multiple Vitamins-Minerals (CENTRUM ADULTS PO) Take by mouth every morning      Multiple Vitamins-Minerals (ZINC PO) Take by mouth daily      Omega-3 Fatty Acids (FISH OIL) 1200 MG CAPS Take 2 capsules by mouth daily      rosuvastatin (CRESTOR) 20 MG tablet       torsemide (DEMADEX) 20 mg tablet Take 20 mg by mouth daily      triamterene-hydrochlorothiazide (MAXZIDE-25) 37 5-25 mg per tablet Take 1 tablet by mouth daily 90 tablet 1    COLCRYS 0 6 MG tablet       naproxen (NAPROSYN) 500 mg tablet Take 1 tablet (500 mg total) by mouth 2 (two) times a day with meals 28 tablet 1     No current facility-administered medications for this visit          Objective:    /66   Pulse 70   Temp 98 °F (36 7 °C) (Temporal)   Resp 18   Ht 6' 3 75" (1 924 m)   Wt (!) 170 kg (374 lb)   SpO2 97%   BMI 45 83 kg/m² Physical Exam  Constitutional:       Appearance: He is well-developed  HENT:      Head: Normocephalic and atraumatic  Eyes:      General:         Right eye: No discharge  Left eye: No discharge  Conjunctiva/sclera: Conjunctivae normal       Pupils: Pupils are equal, round, and reactive to light  Neck:      Musculoskeletal: Neck supple  Thyroid: No thyromegaly  Vascular: No JVD  Cardiovascular:      Rate and Rhythm: Normal rate and regular rhythm  Heart sounds: Normal heart sounds  No murmur  Pulmonary:      Effort: Pulmonary effort is normal       Breath sounds: Normal breath sounds  No wheezing or rales  Abdominal:      General: Bowel sounds are normal       Palpations: Abdomen is soft  There is no mass  Tenderness: There is no abdominal tenderness  There is no guarding or rebound  Comments: OBESE   Musculoskeletal: Normal range of motion  General: Tenderness present  No deformity  Comments: MODERATE TENDERNESS OVER THE L FALSE RIBS   Lymphadenopathy:      Cervical: No cervical adenopathy  Skin:     General: Skin is warm and dry  Findings: No erythema or rash  Neurological:      Mental Status: He is alert and oriented to person, place, and time  Psychiatric:         Behavior: Behavior normal          Thought Content:  Thought content normal          Judgment: Judgment normal                 Randy Forman MD

## 2021-09-14 ENCOUNTER — OFFICE VISIT (OUTPATIENT)
Dept: FAMILY MEDICINE CLINIC | Facility: CLINIC | Age: 51
End: 2021-09-14
Payer: COMMERCIAL

## 2021-09-14 VITALS
OXYGEN SATURATION: 97 % | RESPIRATION RATE: 16 BRPM | WEIGHT: 315 LBS | DIASTOLIC BLOOD PRESSURE: 80 MMHG | BODY MASS INDEX: 37.19 KG/M2 | HEIGHT: 77 IN | TEMPERATURE: 98 F | SYSTOLIC BLOOD PRESSURE: 138 MMHG | HEART RATE: 89 BPM

## 2021-09-14 DIAGNOSIS — L03.311 CELLULITIS OF ABDOMINAL WALL: Primary | ICD-10-CM

## 2021-09-14 DIAGNOSIS — T14.8XXA BITE WOUND: ICD-10-CM

## 2021-09-14 PROCEDURE — 99214 OFFICE O/P EST MOD 30 MIN: CPT | Performed by: NURSE PRACTITIONER

## 2021-09-14 PROCEDURE — 3008F BODY MASS INDEX DOCD: CPT | Performed by: NURSE PRACTITIONER

## 2021-09-14 PROCEDURE — 3725F SCREEN DEPRESSION PERFORMED: CPT | Performed by: NURSE PRACTITIONER

## 2021-09-14 PROCEDURE — 1036F TOBACCO NON-USER: CPT | Performed by: NURSE PRACTITIONER

## 2021-09-14 PROCEDURE — 10160 PNXR ASPIR ABSC HMTMA BULLA: CPT | Performed by: NURSE PRACTITIONER

## 2021-09-14 RX ORDER — CEPHALEXIN 500 MG/1
500 CAPSULE ORAL EVERY 12 HOURS SCHEDULED
Qty: 20 CAPSULE | Refills: 0 | Status: SHIPPED | OUTPATIENT
Start: 2021-09-14 | End: 2021-09-24

## 2021-09-14 RX ORDER — VALSARTAN AND HYDROCHLOROTHIAZIDE 160; 12.5 MG/1; MG/1
TABLET, FILM COATED ORAL
COMMUNITY
Start: 2021-09-08

## 2021-09-14 NOTE — PROGRESS NOTES
Incision and Drainage    Date/Time: 9/14/2021 12:52 PM  Performed by: GAUTAM Green  Authorized by: GAUTAM Green   Universal Protocol:  Consent: Verbal consent obtained  Consent given by: patient  Patient identity confirmed: verbally with patient      Location:     Type:  Abscess    Location:  Trunk    Trunk location:  Abdomen  Pre-procedure details:     Skin preparation:  Betadine  Anesthesia (see MAR for exact dosages): Anesthesia method:  Local infiltration    Local anesthetic:  Lidocaine 1% w/o epi  Procedure details:     Complexity:  Simple    Needle aspiration: yes      Needle size:  18 G    Incision types:  Single straight    Aspiration type: puncture aspiration      Incision depth:  Subcutaneous    Drainage:  Bloody    Drainage amount:  Scant    Wound treatment:  Wound left open  Post-procedure details:     Patient tolerance of procedure:   Tolerated well, no immediate complications

## 2021-09-14 NOTE — PROGRESS NOTES
Assessment/Plan:    1  Cellulitis of abdominal wall  -     Culture, Aerobic Bacteria  -     Anaerobic culture and Gram stain  -     cephalexin (KEFLEX) 500 mg capsule; Take 1 capsule (500 mg total) by mouth every 12 (twelve) hours for 10 days  -     Incision and Drainage    2  Bite wound  -     Culture, Aerobic Bacteria  -     Anaerobic culture and Gram stain  -     cephalexin (KEFLEX) 500 mg capsule; Take 1 capsule (500 mg total) by mouth every 12 (twelve) hours for 10 days  -     Incision and Drainage    Will evaluate culture for MRSA - if positive change antibiotic therapy to bactrim  Patient Instructions   Advise warm compresses 3-4 times daily  Monitor for improvement with use of antibiotics  Return in about 1 week (around 9/21/2021), or if symptoms worsen or fail to improve  Subjective:      Patient ID: Germania Lozano is a 54 y o  male  Chief Complaint   Patient presents with    Insect Bite     stomach       Elizabeth Muniz is a 54year old male who presents to the office for evaluation and management of bite wound  Reports he was bitten by some kind of bug yesterday outside and noticed it when he was undoing his belt  Reports pain during the incident of the bite  Reports he has redness and tenderness that has been spreading across his lower abdomen  Denies fever, chills, chest pain, shortness of breath  Denies bee sting allergy  The following portions of the patient's history were reviewed and updated as appropriate: allergies, current medications, past family history, past medical history, past social history, past surgical history and problem list     Review of Systems   Constitutional: Negative for diaphoresis, fatigue and fever  Respiratory: Negative for chest tightness and shortness of breath  Cardiovascular: Negative for chest pain, palpitations and leg swelling  Gastrointestinal: Positive for abdominal pain (superficial skin tenderness and redness)   Negative for diarrhea and nausea  Musculoskeletal: Negative for arthralgias and myalgias  Skin: Positive for wound  Negative for rash  Neurological: Negative for dizziness, numbness and headaches  Psychiatric/Behavioral: Negative for sleep disturbance  Current Outpatient Medications   Medication Sig Dispense Refill    Ascorbic Acid (VITAMIN C) 500 MG CAPS Take 2 tablets by mouth daily      BYSTOLIC 10 MG tablet       choline fenofibrate (TRILIPIX) 135 MG capsule TAKE 1 CAPSULE DAILY      Multiple Vitamins-Minerals (CENTRUM ADULTS PO) Take by mouth every morning      Multiple Vitamins-Minerals (ZINC PO) Take by mouth daily      naproxen (NAPROSYN) 500 mg tablet Take 1 tablet (500 mg total) by mouth 2 (two) times a day with meals 28 tablet 1    Omega-3 Fatty Acids (FISH OIL) 1200 MG CAPS Take 2 capsules by mouth daily      rosuvastatin (CRESTOR) 20 MG tablet       torsemide (DEMADEX) 20 mg tablet Take 20 mg by mouth daily      triamterene-hydrochlorothiazide (MAXZIDE-25) 37 5-25 mg per tablet Take 1 tablet by mouth daily 90 tablet 1    amLODIPine-valsartan (EXFORGE) 5-160 MG per tablet Take 1 tablet by mouth daily (Patient not taking: Reported on 9/14/2021)      cephalexin (KEFLEX) 500 mg capsule Take 1 capsule (500 mg total) by mouth every 12 (twelve) hours for 10 days 20 capsule 0    COLCRYS 0 6 MG tablet  (Patient not taking: Reported on 9/14/2021)      valsartan-hydrochlorothiazide (DIOVAN-HCT) 160-12 5 MG per tablet  (Patient not taking: Reported on 9/14/2021)       No current facility-administered medications for this visit  Objective:    /80   Pulse 89   Temp 98 °F (36 7 °C) (Temporal)   Resp 16   Ht 6' 5" (1 956 m)   Wt (!) 166 kg (366 lb)   SpO2 97%   BMI 43 40 kg/m²        Physical Exam  Vitals reviewed  Constitutional:       Appearance: Normal appearance  HENT:      Head: Normocephalic and atraumatic  Abdominal:          Comments: Bite wound with white center, dry   Indurated erythema around area of bite and extending over to the left lower abdomen  Tenderness with light palpation over the area  Neurological:      Mental Status: He is alert and oriented to person, place, and time     Psychiatric:         Mood and Affect: Mood normal                 GAUTAM Green

## 2021-09-20 LAB
BACTERIA SPEC ANAEROBE CULT: NORMAL
Lab: NORMAL

## 2021-10-28 DIAGNOSIS — I10 ESSENTIAL HYPERTENSION: Primary | ICD-10-CM

## 2021-10-28 DIAGNOSIS — E78.2 MIXED HYPERLIPIDEMIA: ICD-10-CM

## 2021-10-28 DIAGNOSIS — Z12.5 SCREENING FOR PROSTATE CANCER: ICD-10-CM

## 2021-10-28 DIAGNOSIS — Z11.52 ENCOUNTER FOR SCREENING FOR COVID-19: ICD-10-CM

## 2021-10-28 DIAGNOSIS — Z13.29 SCREENING FOR HYPOTHYROIDISM: ICD-10-CM

## 2021-10-28 DIAGNOSIS — K75.81 NASH (NONALCOHOLIC STEATOHEPATITIS): ICD-10-CM

## 2021-11-12 LAB
ALBUMIN SERPL-MCNC: 4.5 G/DL (ref 4–5)
ALBUMIN/GLOB SERPL: 1.6 {RATIO} (ref 1.2–2.2)
ALP SERPL-CCNC: 59 IU/L (ref 44–121)
ALT SERPL-CCNC: 48 IU/L (ref 0–44)
AST SERPL-CCNC: 64 IU/L (ref 0–40)
BASOPHILS # BLD AUTO: 0.1 X10E3/UL (ref 0–0.2)
BASOPHILS NFR BLD AUTO: 1 %
BILIRUB SERPL-MCNC: 0.3 MG/DL (ref 0–1.2)
BUN SERPL-MCNC: 11 MG/DL (ref 6–24)
BUN/CREAT SERPL: 12 (ref 9–20)
CALCIUM SERPL-MCNC: 9.8 MG/DL (ref 8.7–10.2)
CHLORIDE SERPL-SCNC: 98 MMOL/L (ref 96–106)
CHOLEST SERPL-MCNC: 157 MG/DL (ref 100–199)
CHOLEST/HDLC SERPL: 5.8 RATIO (ref 0–5)
CO2 SERPL-SCNC: 23 MMOL/L (ref 20–29)
CREAT SERPL-MCNC: 0.91 MG/DL (ref 0.76–1.27)
EOSINOPHIL # BLD AUTO: 0.3 X10E3/UL (ref 0–0.4)
EOSINOPHIL NFR BLD AUTO: 4 %
ERYTHROCYTE [DISTWIDTH] IN BLOOD BY AUTOMATED COUNT: 13 % (ref 11.6–15.4)
GLOBULIN SER-MCNC: 2.9 G/DL (ref 1.5–4.5)
GLUCOSE SERPL-MCNC: 115 MG/DL (ref 65–99)
HCT VFR BLD AUTO: 40.7 % (ref 37.5–51)
HDLC SERPL-MCNC: 27 MG/DL
HGB BLD-MCNC: 14.1 G/DL (ref 13–17.7)
IMM GRANULOCYTES # BLD: 0 X10E3/UL (ref 0–0.1)
IMM GRANULOCYTES NFR BLD: 0 %
LDLC SERPL CALC-MCNC: 58 MG/DL (ref 0–99)
LDLC SERPL DIRECT ASSAY-MCNC: 52 MG/DL (ref 0–99)
LYMPHOCYTES # BLD AUTO: 2.3 X10E3/UL (ref 0.7–3.1)
LYMPHOCYTES NFR BLD AUTO: 32 %
MCH RBC QN AUTO: 31.3 PG (ref 26.6–33)
MCHC RBC AUTO-ENTMCNC: 34.6 G/DL (ref 31.5–35.7)
MCV RBC AUTO: 90 FL (ref 79–97)
MONOCYTES # BLD AUTO: 0.5 X10E3/UL (ref 0.1–0.9)
MONOCYTES NFR BLD AUTO: 7 %
NEUTROPHILS # BLD AUTO: 4 X10E3/UL (ref 1.4–7)
NEUTROPHILS NFR BLD AUTO: 56 %
PLATELET # BLD AUTO: 156 X10E3/UL (ref 150–450)
POTASSIUM SERPL-SCNC: 4.6 MMOL/L (ref 3.5–5.2)
PROT SERPL-MCNC: 7.4 G/DL (ref 6–8.5)
RBC # BLD AUTO: 4.51 X10E6/UL (ref 4.14–5.8)
SL AMB EGFR AFRICAN AMERICAN: 114 ML/MIN/1.73
SL AMB EGFR NON AFRICAN AMERICAN: 99 ML/MIN/1.73
SL AMB VLDL CHOLESTEROL CALC: 72 MG/DL (ref 5–40)
SODIUM SERPL-SCNC: 139 MMOL/L (ref 134–144)
TRIGL SERPL-MCNC: 473 MG/DL (ref 0–149)
TSH SERPL DL<=0.005 MIU/L-ACNC: 2.49 UIU/ML (ref 0.45–4.5)
WBC # BLD AUTO: 7.1 X10E3/UL (ref 3.4–10.8)

## 2021-11-13 LAB
PSA SERPL-MCNC: 0.6 NG/ML (ref 0–4)
SARS-COV-2 IGM SERPL QL IA: NEGATIVE
SARS-COV-2 SEMI-QUANT IGG AB: 371 AU/ML
SARS-COV-2 SPIKE AB INTERP CONTAINING ATTACHED ABBREV COVDSN: POSITIVE
SL AMB REFLEX CRITERIA: NORMAL

## 2021-11-15 LAB — SARS-COV-2 IGA SERPL QL IA: NEGATIVE

## 2021-11-22 ENCOUNTER — OFFICE VISIT (OUTPATIENT)
Dept: FAMILY MEDICINE CLINIC | Facility: CLINIC | Age: 51
End: 2021-11-22
Payer: COMMERCIAL

## 2021-11-22 VITALS
RESPIRATION RATE: 16 BRPM | HEIGHT: 77 IN | OXYGEN SATURATION: 97 % | HEART RATE: 71 BPM | WEIGHT: 315 LBS | BODY MASS INDEX: 37.19 KG/M2 | TEMPERATURE: 97.8 F | SYSTOLIC BLOOD PRESSURE: 122 MMHG | DIASTOLIC BLOOD PRESSURE: 74 MMHG

## 2021-11-22 DIAGNOSIS — E78.2 MIXED HYPERLIPIDEMIA: ICD-10-CM

## 2021-11-22 DIAGNOSIS — I10 ESSENTIAL HYPERTENSION: ICD-10-CM

## 2021-11-22 DIAGNOSIS — K75.81 NASH (NONALCOHOLIC STEATOHEPATITIS): ICD-10-CM

## 2021-11-22 DIAGNOSIS — Z00.00 ENCOUNTER FOR ANNUAL GENERAL MEDICAL EXAMINATION WITHOUT ABNORMAL FINDINGS IN ADULT: Primary | ICD-10-CM

## 2021-11-22 DIAGNOSIS — R60.0 LOWER EXTREMITY EDEMA: ICD-10-CM

## 2021-11-22 DIAGNOSIS — E66.01 CLASS 3 SEVERE OBESITY DUE TO EXCESS CALORIES WITH SERIOUS COMORBIDITY AND BODY MASS INDEX (BMI) OF 40.0 TO 44.9 IN ADULT (HCC): ICD-10-CM

## 2021-11-22 DIAGNOSIS — Z23 NEED FOR INFLUENZA VACCINATION: ICD-10-CM

## 2021-11-22 DIAGNOSIS — R73.01 ELEVATED FASTING BLOOD SUGAR: ICD-10-CM

## 2021-11-22 PROBLEM — Z12.11 COLON CANCER SCREENING: Status: RESOLVED | Noted: 2019-11-13 | Resolved: 2021-11-22

## 2021-11-22 PROBLEM — Z13.29 SCREENING FOR HYPOTHYROIDISM: Status: RESOLVED | Noted: 2019-11-13 | Resolved: 2021-11-22

## 2021-11-22 PROBLEM — M10.9 GOUT: Status: ACTIVE | Noted: 2021-09-23

## 2021-11-22 PROBLEM — R10.12 LEFT UPPER QUADRANT ABDOMINAL PAIN: Status: RESOLVED | Noted: 2021-03-03 | Resolved: 2021-11-22

## 2021-11-22 PROBLEM — Z12.5 ENCOUNTER FOR PROSTATE CANCER SCREENING: Status: RESOLVED | Noted: 2019-11-13 | Resolved: 2021-11-22

## 2021-11-22 LAB — SL AMB POCT HEMOGLOBIN AIC: 5.4 (ref ?–6.5)

## 2021-11-22 PROCEDURE — 99396 PREV VISIT EST AGE 40-64: CPT | Performed by: NURSE PRACTITIONER

## 2021-11-22 PROCEDURE — 90686 IIV4 VACC NO PRSV 0.5 ML IM: CPT

## 2021-11-22 PROCEDURE — 1036F TOBACCO NON-USER: CPT | Performed by: NURSE PRACTITIONER

## 2021-11-22 PROCEDURE — 83036 HEMOGLOBIN GLYCOSYLATED A1C: CPT | Performed by: NURSE PRACTITIONER

## 2021-11-22 PROCEDURE — 3008F BODY MASS INDEX DOCD: CPT | Performed by: NURSE PRACTITIONER

## 2021-11-22 PROCEDURE — 90471 IMMUNIZATION ADMIN: CPT

## 2022-04-19 ENCOUNTER — TELEMEDICINE (OUTPATIENT)
Dept: FAMILY MEDICINE CLINIC | Facility: CLINIC | Age: 52
End: 2022-04-19
Payer: COMMERCIAL

## 2022-04-19 VITALS — BODY MASS INDEX: 37.19 KG/M2 | WEIGHT: 315 LBS | HEIGHT: 77 IN | TEMPERATURE: 98.4 F

## 2022-04-19 DIAGNOSIS — J01.40 ACUTE NON-RECURRENT PANSINUSITIS: Primary | ICD-10-CM

## 2022-04-19 PROCEDURE — 1036F TOBACCO NON-USER: CPT | Performed by: NURSE PRACTITIONER

## 2022-04-19 PROCEDURE — 99213 OFFICE O/P EST LOW 20 MIN: CPT | Performed by: NURSE PRACTITIONER

## 2022-04-19 PROCEDURE — 3725F SCREEN DEPRESSION PERFORMED: CPT | Performed by: NURSE PRACTITIONER

## 2022-04-19 PROCEDURE — 3008F BODY MASS INDEX DOCD: CPT | Performed by: NURSE PRACTITIONER

## 2022-04-19 RX ORDER — METOPROLOL SUCCINATE 50 MG/1
50 TABLET, EXTENDED RELEASE ORAL DAILY
COMMUNITY
Start: 2022-01-28 | End: 2023-01-28

## 2022-04-19 NOTE — PROGRESS NOTES
Virtual Regular Visit    Verification of patient location:    Patient is located in the following state in which I hold an active license [place]      Assessment/Plan:    Problem List Items Addressed This Visit     None      Visit Diagnoses     Acute non-recurrent pansinusitis    -  Primary    Symptoms x's 1 day  Recommend supportive care with fluids and rest  Flonase, mucinex and zyrtec  Call if no improvement in 3-4 days  Increase fluid intake as tolerated  Rest and humidification   Continue medications as directed   - Flonase OTC 1-2 sprays each nostril daily PRN post nasal drip   - Mucinex OTC to loosen secretions   Return to office in one week if symptoms persist or worsen           Reason for visit is   Chief Complaint   Patient presents with    Nasal Congestion     S/S STARTED 4/18    Cough     NOT VACCINATED     Sinus Problem    Virtual Regular Visit        Encounter provider Brian Toro, [facility]    Provider located at [address] [place] [address]  [phone]      Recent Visits  No visits were found meeting these conditions  Showing recent visits within past 7 days and meeting all other requirements  Today's Visits  Date Type Provider Dept   04/19/22 Telemedicine Brian Toro, [facility]   Showing today's visits and meeting all other requirements  Future Appointments  No visits were found meeting these conditions  Showing future appointments within next 150 days and meeting all other requirements       The patient was identified by name and date of birth  Camelia Phillips was informed that this is a telemedicine visit and that the visit is being conducted through [address] [place] [address] Now and patient was informed that this is a secure, HIPAA-compliant platform  He agrees to proceed     My office door was closed  No one else was in the room    He acknowledged consent and understanding of privacy and security of the video platform  The patient has agreed to participate and understands they can discontinue the visit at any time  Patient is aware this is a billable service  Subjective    Sinusitis  This is a new problem  The current episode started yesterday  The problem is unchanged  There has been no fever  He is experiencing no pain  Associated symptoms include congestion, coughing, sinus pressure and a sore throat  Pertinent negatives include no chills, diaphoresis, ear pain, headaches or shortness of breath  Past treatments include nothing  The treatment provided no relief          Past Medical History:   Diagnosis Date    Hemorrhoids     last assessed-9/12/2014    Hyperlipidemia     last assessed-11/7/2017    Hypertension     Obesity     PONV (postoperative nausea and vomiting)     this was with a brother       Past Surgical History:   Procedure Laterality Date    HAND FRACTURE REPAIR Left     repait Fina Matias Veronica Hahn 7[address] N/A 10/9/2017    Procedure: REPAIR HERNIA VENTRAL WITH MESH;  Surgeon: René Granado MD;  Location: [address] [place] [address];  Service: General    VASECTOMY      surgery vas deferens vasectomy       Current Outpatient Medications   Medication Sig Dispense Refill    Ascorbic Acid (VITAMIN C) 500 MG CAPS Take 2 tablets by mouth daily      BYSTOLIC 10 MG tablet Taking 20 mg       choline fenofibrate (TRILIPIX) 135 MG capsule TAKE 1 CAPSULE DAILY      cyanocobalamin (VITAMIN B-12) 2000 MCG tablet Take 2,000 mcg by mouth daily Taking 2500      metoprolol succinate (TOPROL-XL) 50 mg 24 hr tablet Take 50 mg by mouth daily      Multiple Vitamins-Minerals (CENTRUM ADULTS PO) Take by mouth every morning      Multiple Vitamins-Minerals (ZINC PO) Take by mouth daily      Omega-3 Fatty Acids (FISH OIL) 1200 MG CAPS Take 2 capsules by mouth daily      rosuvastatin (CRESTOR) 20 MG tablet       torsemide (DEMADEX) 20 mg tablet Take 20 mg by mouth daily      triamterene-hydrochlorothiazide (MAXZIDE-25) 37 5-25 mg per tablet Take 1 tablet by mouth daily 90 tablet 1    valsartan-hydrochlorothiazide (DIOVAN-HCT) 160-12 5 MG per tablet         No current facility-administered medications for this visit  No Known Allergies    Review of Systems   Constitutional: Negative for chills, diaphoresis, fatigue and fever  HENT: Positive for congestion, postnasal drip, sinus pressure and sore throat  Negative for ear discharge, ear pain, rhinorrhea and sinus pain  Respiratory: Positive for cough  Negative for chest tightness, shortness of breath and wheezing  Cardiovascular: Negative for chest pain  Gastrointestinal: Negative for diarrhea, nausea and vomiting  Musculoskeletal: Negative for myalgias  Skin: Negative for rash  Neurological: Negative for dizziness and headaches  Hematological: Negative for adenopathy  Video Exam    Vitals:    04/19/22 1055   Temp: 98 4 °F (36 9 °C)   TempSrc: Temporal   Weight: (!) 167 kg (369 lb)   Height: 6' 5" (1 956 m)       Physical Exam  Vitals reviewed  Constitutional:       Appearance: Normal appearance  HENT:      Head: Normocephalic and atraumatic  Neurological:      Mental Status: He is alert and oriented to person, place, and time  Psychiatric:         Mood and Affect: Mood normal      Audible congestion noted    I spent 15 minutes directly with the patient during this visit    VIRTUAL VISIT DISCLAIMER      Javi Paredes verbally agrees to participate in [employer]  Pt is aware that Virtual Care Services could be limited without vital signs or the ability to perform a full hands-on physical exam  Pranay Pearson understands he or the provider may request at any time to terminate the video visit and request the patient to seek care or treatment in person

## 2022-04-20 ENCOUNTER — TELEPHONE (OUTPATIENT)
Dept: ADMINISTRATIVE | Facility: OTHER | Age: 52
End: 2022-04-20

## 2022-04-20 ENCOUNTER — CLINICAL SUPPORT (OUTPATIENT)
Dept: FAMILY MEDICINE CLINIC | Facility: CLINIC | Age: 52
End: 2022-04-20

## 2022-04-20 DIAGNOSIS — R68.83 CHILLS: Primary | ICD-10-CM

## 2022-04-20 DIAGNOSIS — R50.9 FEVER, UNSPECIFIED FEVER CAUSE: ICD-10-CM

## 2022-04-20 DIAGNOSIS — Z11.52 ENCOUNTER FOR SCREENING FOR COVID-19: ICD-10-CM

## 2022-04-20 PROCEDURE — 87636 SARSCOV2 & INF A&B AMP PRB: CPT | Performed by: NURSE PRACTITIONER

## 2022-04-21 LAB
FLUAV RNA RESP QL NAA+PROBE: NEGATIVE
FLUBV RNA RESP QL NAA+PROBE: NEGATIVE
SARS-COV-2 RNA RESP QL NAA+PROBE: POSITIVE

## 2022-11-01 DIAGNOSIS — E78.2 MIXED HYPERLIPIDEMIA: ICD-10-CM

## 2022-11-01 DIAGNOSIS — Z13.29 SCREENING FOR HYPOTHYROIDISM: ICD-10-CM

## 2022-11-01 DIAGNOSIS — Z12.5 SCREENING FOR PROSTATE CANCER: ICD-10-CM

## 2022-11-01 DIAGNOSIS — R73.9 ELEVATED BLOOD SUGAR: ICD-10-CM

## 2022-11-01 DIAGNOSIS — Z00.00 ENCOUNTER FOR ANNUAL GENERAL MEDICAL EXAMINATION WITHOUT ABNORMAL FINDINGS IN ADULT: Primary | ICD-10-CM

## 2022-11-01 DIAGNOSIS — Z11.59 NEED FOR HEPATITIS C SCREENING TEST: ICD-10-CM

## 2022-11-01 DIAGNOSIS — I10 ESSENTIAL HYPERTENSION: ICD-10-CM

## 2022-11-09 LAB
BASOPHILS # BLD AUTO: 0.1 X10E3/UL (ref 0–0.2)
BASOPHILS NFR BLD AUTO: 1 %
EOSINOPHIL # BLD AUTO: 0.3 X10E3/UL (ref 0–0.4)
EOSINOPHIL NFR BLD AUTO: 4 %
ERYTHROCYTE [DISTWIDTH] IN BLOOD BY AUTOMATED COUNT: 12.9 % (ref 11.6–15.4)
HCT VFR BLD AUTO: 38.3 % (ref 37.5–51)
HGB BLD-MCNC: 13.2 G/DL (ref 13–17.7)
IMM GRANULOCYTES # BLD: 0 X10E3/UL (ref 0–0.1)
IMM GRANULOCYTES NFR BLD: 0 %
LYMPHOCYTES # BLD AUTO: 1.8 X10E3/UL (ref 0.7–3.1)
LYMPHOCYTES NFR BLD AUTO: 26 %
MCH RBC QN AUTO: 31.2 PG (ref 26.6–33)
MCHC RBC AUTO-ENTMCNC: 34.5 G/DL (ref 31.5–35.7)
MCV RBC AUTO: 91 FL (ref 79–97)
MONOCYTES # BLD AUTO: 0.4 X10E3/UL (ref 0.1–0.9)
MONOCYTES NFR BLD AUTO: 6 %
NEUTROPHILS # BLD AUTO: 4.4 X10E3/UL (ref 1.4–7)
NEUTROPHILS NFR BLD AUTO: 63 %
PLATELET # BLD AUTO: 147 X10E3/UL (ref 150–450)
RBC # BLD AUTO: 4.23 X10E6/UL (ref 4.14–5.8)
WBC # BLD AUTO: 7 X10E3/UL (ref 3.4–10.8)

## 2022-11-10 LAB
ALBUMIN SERPL-MCNC: 4.9 G/DL (ref 4–5)
ALBUMIN/GLOB SERPL: 1.8 {RATIO} (ref 1.2–2.2)
ALP SERPL-CCNC: 56 IU/L (ref 44–121)
ALT SERPL-CCNC: 45 IU/L (ref 0–44)
AST SERPL-CCNC: 61 IU/L (ref 0–40)
BILIRUB SERPL-MCNC: 0.3 MG/DL (ref 0–1.2)
BUN SERPL-MCNC: 17 MG/DL (ref 6–24)
BUN/CREAT SERPL: 21 (ref 9–20)
CALCIUM SERPL-MCNC: 9.8 MG/DL (ref 8.7–10.2)
CHLORIDE SERPL-SCNC: 97 MMOL/L (ref 96–106)
CHOLEST SERPL-MCNC: 159 MG/DL (ref 100–199)
CHOLEST/HDLC SERPL: 5.9 RATIO (ref 0–5)
CO2 SERPL-SCNC: 26 MMOL/L (ref 20–29)
CREAT SERPL-MCNC: 0.8 MG/DL (ref 0.76–1.27)
EGFR: 108 ML/MIN/1.73
GLOBULIN SER-MCNC: 2.8 G/DL (ref 1.5–4.5)
GLUCOSE SERPL-MCNC: 118 MG/DL (ref 70–99)
HCV AB S/CO SERPL IA: <0.1 S/CO RATIO (ref 0–0.9)
HDLC SERPL-MCNC: 27 MG/DL
LDLC SERPL CALC-MCNC: 52 MG/DL (ref 0–99)
LDLC SERPL DIRECT ASSAY-MCNC: 47 MG/DL (ref 0–99)
POTASSIUM SERPL-SCNC: 4.3 MMOL/L (ref 3.5–5.2)
PROT SERPL-MCNC: 7.7 G/DL (ref 6–8.5)
PSA SERPL-MCNC: 0.2 NG/ML (ref 0–4)
SL AMB REFLEX CRITERIA: NORMAL
SL AMB VLDL CHOLESTEROL CALC: 80 MG/DL (ref 5–40)
SODIUM SERPL-SCNC: 137 MMOL/L (ref 134–144)
TRIGL SERPL-MCNC: 544 MG/DL (ref 0–149)
TSH SERPL DL<=0.005 MIU/L-ACNC: 2.02 UIU/ML (ref 0.45–4.5)

## 2022-12-19 ENCOUNTER — TELEPHONE (OUTPATIENT)
Dept: FAMILY MEDICINE CLINIC | Facility: CLINIC | Age: 52
End: 2022-12-19

## 2022-12-19 NOTE — TELEPHONE ENCOUNTER
Karissa An has a physical appointment on 1/20/23  He did his blood work on 11/9/22  Do you want to do any additional blood work on him?  His Lipids were abnormal

## 2023-01-19 RX ORDER — VALSARTAN AND HYDROCHLOROTHIAZIDE 320; 12.5 MG/1; MG/1
TABLET, FILM COATED ORAL
COMMUNITY
Start: 2022-10-26

## 2023-01-20 ENCOUNTER — OFFICE VISIT (OUTPATIENT)
Dept: FAMILY MEDICINE CLINIC | Facility: CLINIC | Age: 53
End: 2023-01-20

## 2023-01-20 VITALS
HEIGHT: 76 IN | DIASTOLIC BLOOD PRESSURE: 70 MMHG | OXYGEN SATURATION: 97 % | BODY MASS INDEX: 38.36 KG/M2 | TEMPERATURE: 97.7 F | WEIGHT: 315 LBS | SYSTOLIC BLOOD PRESSURE: 148 MMHG | RESPIRATION RATE: 16 BRPM | HEART RATE: 92 BPM

## 2023-01-20 DIAGNOSIS — K75.81 NASH (NONALCOHOLIC STEATOHEPATITIS): ICD-10-CM

## 2023-01-20 DIAGNOSIS — Z12.11 ENCOUNTER FOR SCREENING FECAL OCCULT BLOOD TESTING: ICD-10-CM

## 2023-01-20 DIAGNOSIS — E66.01 CLASS 3 SEVERE OBESITY DUE TO EXCESS CALORIES WITH SERIOUS COMORBIDITY AND BODY MASS INDEX (BMI) OF 40.0 TO 44.9 IN ADULT (HCC): ICD-10-CM

## 2023-01-20 DIAGNOSIS — I10 ESSENTIAL HYPERTENSION: ICD-10-CM

## 2023-01-20 DIAGNOSIS — R59.9 LYMPH NODE ENLARGEMENT: ICD-10-CM

## 2023-01-20 DIAGNOSIS — Z12.11 SCREENING FOR COLON CANCER: ICD-10-CM

## 2023-01-20 DIAGNOSIS — Z00.00 ENCOUNTER FOR ANNUAL GENERAL MEDICAL EXAMINATION WITHOUT ABNORMAL FINDINGS IN ADULT: Primary | ICD-10-CM

## 2023-01-20 DIAGNOSIS — E78.2 MIXED HYPERLIPIDEMIA: ICD-10-CM

## 2023-01-20 DIAGNOSIS — R60.0 LOWER EXTREMITY EDEMA: ICD-10-CM

## 2023-01-20 PROBLEM — M10.9 GOUT: Status: RESOLVED | Noted: 2021-09-23 | Resolved: 2023-01-20

## 2023-01-20 LAB — SL AMB POCT FECES OCC BLD: NEGATIVE

## 2023-01-20 NOTE — PROGRESS NOTES
FAMILY PRACTICE HEALTH MAINTENANCE OFFICE VISIT  [facility]    NAME: Luis Nora  AGE: 50 y o  SEX: male  : 1970     DATE: 2023    Assessment and Plan     1  Encounter for annual general medical examination without abnormal findings in adult  Comments:  Age appropriate screenings and recommendations discussed  Fasting labs reviewed  2  Lymph node enlargement  -     US head neck soft tissue; Future; Expected date: 2023    3  Essential hypertension  Assessment & Plan:  Recommend regular exercise and nutrition  Stable, no changes  Orders:  -     POCT ECG    4  Class 3 severe obesity due to excess calories with serious comorbidity and body mass index (BMI) of 40 0 to 44 9 in adult [facility])  Assessment & Plan:  Encourage nutrition and weight loss    Orders:  -     POCT ECG    5  Lower extremity edema  Assessment & Plan:  Sodium reduction  Elevate legs when resting  Orders:  -     POCT ECG    6  Mixed hyperlipidemia  Assessment & Plan:  Elevated triglycerides - taking trilipix 135 mg  Following up with cardiology  Encourage reduction of carbs and alcohol  Recommend heart healthy nutrition  7  Screening for colon cancer  -     Ambulatory referral for colonoscopy; Future    8  OSMAN (nonalcoholic steatohepatitis)  Assessment & Plan:  Liver enzymes continue to be elevated  Encourage weight loss         9  Encounter for screening fecal occult blood testing  -     POCT hemoccult screening      · Patient Counseling:   · Nutrition: Stressed importance of a well balanced diet, moderation of sodium/saturated fat, caloric balance and sufficient intake of fiber  · Exercise: Stressed the importance of regular exercise with a goal of 150 minutes per week  · Dental Health: Discussed daily flossing and brushing and regular dental visits   · Alcohol Use:  Recommended moderation of alcohol intake  · Injury Prevention: Discussed Safety Belts, Safety Helmets, and Smoke Detectors    · Immunizations reviewed: Risks and Benefits discussed and Declined recommended vaccinations  · Discussed benefits of:  Colon Cancer Screening, Prostate Cancer Screening  and Screening labs  • BMI Counseling: Body mass index is 45 16 kg/m²  Discussed with patient's BMI with him  The BMI is above normal  Nutrition recommendations include reducing portion sizes, decreasing overall calorie intake, 3-5 servings of fruits/vegetables daily, reducing fast food intake, consuming healthier snacks, decreasing soda and/or juice intake, moderation in carbohydrate intake, increasing intake of lean protein, reducing intake of saturated fat and trans fat and reducing intake of cholesterol  Exercise recommendations include exercising 3-5 times per week  Return if symptoms worsen or fail to improve  Chief Complaint     Chief Complaint   Patient presents with   • Annual Exam       History of Present Illness     HPI    Well Adult Physical   Patient here for a comprehensive physical exam       Diet and Physical Activity  Diet: poor diet  Exercise: intermittently      Depression Screen  PHQ-2/9 Depression Screening    Little interest or pleasure in doing things: 0 - not at all  Feeling down, depressed, or hopeless: 0 - not at all  PHQ-2 Score: 0  PHQ-2 Interpretation: Negative depression screen          General Health  Hearing: Normal:  bilateral  Vision: no vision problems  Dental: regular dental visits    Reproductive Health  No issues       The following portions of the patient's history were reviewed and updated as appropriate: allergies, current medications, past family history, past medical history, past social history, past surgical history and problem list     Review of Systems     Review of Systems   Constitutional: Negative for diaphoresis, fatigue and fever  HENT: Negative for ear pain and hearing loss  Eyes: Negative for pain and visual disturbance     Respiratory: Negative for chest tightness and shortness of breath  Cardiovascular: Negative for chest pain, palpitations and leg swelling  Gastrointestinal: Negative for abdominal pain, constipation and diarrhea  Genitourinary: Negative for difficulty urinating  Musculoskeletal: Negative for arthralgias and myalgias  Skin: Negative for rash  Lump on the back of his neck   Neurological: Negative for dizziness, numbness and headaches  Psychiatric/Behavioral: Negative for sleep disturbance  Past Medical History     Past Medical History:   Diagnosis Date   • Hemorrhoids     last assessed-9/12/2014   • Hyperlipidemia     last assessed-11/7/2017   • Hypertension    • Obesity    • PONV (postoperative nausea and vomiting)     this was with a brother       Past Surgical History     Past Surgical History:   Procedure Laterality Date   • HAND FRACTURE REPAIR Left     repait 1990   • MI REPAIR FIRST ABDOMINAL WALL HERNIA N/A 10/9/2017    Procedure: REPAIR HERNIA VENTRAL WITH MESH;  Surgeon: Sunny Dean MD;  Location: [address] [place] [address];  Service: General   • VASECTOMY      surgery vas deferens vasectomy       Social History     Social History     Socioeconomic History   • Marital status: /Civil Union     Spouse name: None   • Number of children: None   • Years of education: None   • Highest education level: None   Occupational History   • None   Tobacco Use   • Smoking status: Never   • Smokeless tobacco: Former   Vaping Use   • Vaping Use: Never used   Substance and Sexual Activity   • Alcohol use: Yes     Comment: socially 4x week       • Drug use: No   • Sexual activity: Yes   Other Topics Concern   • None   Social History Narrative    Dental care, regularly    No caffeine use     Social Determinants of Health     Financial Resource Strain: Not on file   Food Insecurity: Not on file   Transportation Needs: Not on file   Physical Activity: Not on file   Stress: Not on file   Social Connections: Not on file Intimate Partner Violence: Not on file   Housing Stability: Not on file       Family History     Family History   Problem Relation Age of Onset   • Heart disease Mother         cardiac disorder   • Heart failure Mother         CHF   • Melanoma Mother    • Heart disease Father         cardiac disorder   • Heart disease Brother         heart artery stent   • Substance Abuse Neg Hx    • Mental illness Neg Hx        Current Medications       Current Outpatient Medications:   •  Ascorbic Acid (VITAMIN C) 500 MG CAPS, Take 2 tablets by mouth daily, Disp: , Rfl:   •  choline fenofibrate (TRILIPIX) 135 MG capsule, TAKE 1 CAPSULE DAILY, Disp: , Rfl:   •  cyanocobalamin (VITAMIN B-12) 2000 MCG tablet, Take 2,000 mcg by mouth daily Taking 2500, Disp: , Rfl:   •  Multiple Vitamins-Minerals (CENTRUM ADULTS PO), Take by mouth every morning, Disp: , Rfl:   •  Multiple Vitamins-Minerals (ZINC PO), Take by mouth daily, Disp: , Rfl:   •  Omega-3 Fatty Acids (FISH OIL) 1200 MG CAPS, Take 2 capsules by mouth daily, Disp: , Rfl:   •  rosuvastatin (CRESTOR) 20 MG tablet, , Disp: , Rfl:   •  valsartan-hydrochlorothiazide (DIOVAN-HCT) 320-12 5 MG per tablet, , Disp: , Rfl:   •  torsemide (DEMADEX) 20 mg tablet, Take 20 mg by mouth daily, Disp: , Rfl:      Allergies     No Known Allergies    Objective     /70 (BP Location: Left arm, Patient Position: Sitting, Cuff Size: Large)   Pulse 92   Temp 97 7 °F (36 5 °C) (Temporal)   Resp 16   Ht 6' 4" (1 93 m)   Wt (!) 168 kg (371 lb)   SpO2 97%   BMI 45 16 kg/m²      Physical Exam  Vitals reviewed  Constitutional:       General: He is not in acute distress  Appearance: Normal appearance  He is well-developed  He is obese  He is not diaphoretic  HENT:      Head: Normocephalic and atraumatic        Right Ear: Tympanic membrane, ear canal and external ear normal       Left Ear: Tympanic membrane, ear canal and external ear normal    Eyes:      General: Lids are normal  Extraocular Movements: Extraocular movements intact  Conjunctiva/sclera: Conjunctivae normal       Pupils: Pupils are equal, round, and reactive to light  Pupils are equal       Funduscopic exam:     Right eye: No hemorrhage or exudate  Red reflex present  Left eye: No hemorrhage or exudate  Red reflex present  Neck:      Thyroid: No thyroid mass or thyromegaly  Vascular: No carotid bruit  Cardiovascular:      Rate and Rhythm: Normal rate and regular rhythm  Pulses: Normal pulses  Heart sounds: Normal heart sounds, S1 normal and S2 normal  No murmur heard  Pulmonary:      Effort: Pulmonary effort is normal       Breath sounds: Normal breath sounds  No decreased breath sounds, wheezing, rhonchi or rales  Abdominal:      General: Bowel sounds are normal  There is distension (obese)  Palpations: Abdomen is soft  There is no hepatomegaly or splenomegaly  Tenderness: There is no abdominal tenderness  Genitourinary:     Prostate: Normal       Rectum: Normal  Guaiac result negative  Musculoskeletal:         General: No tenderness or deformity  Normal range of motion  Cervical back: Full passive range of motion without pain, normal range of motion and neck supple  Right lower leg: No edema  Left lower leg: No edema  Lymphadenopathy:      Cervical: No cervical adenopathy  Upper Body:      Right upper body: No supraclavicular adenopathy  Left upper body: No supraclavicular adenopathy  Skin:     General: Skin is warm and dry  Findings: No rash  Neurological:      General: No focal deficit present  Mental Status: He is alert and oriented to person, place, and time  Cranial Nerves: No cranial nerve deficit  Motor: Motor function is intact  Coordination: Coordination normal       Deep Tendon Reflexes: Reflexes are normal and symmetric     Psychiatric:         Speech: Speech normal          Behavior: Behavior normal  Thought Content:  Thought content normal          Judgment: Judgment normal                Lonna Homans, [facility],[address]

## 2023-01-20 NOTE — ASSESSMENT & PLAN NOTE
Elevated triglycerides - taking trilipix 135 mg  Following up with cardiology  Encourage reduction of carbs and alcohol  Recommend heart healthy nutrition

## 2023-01-27 ENCOUNTER — HOSPITAL ENCOUNTER (OUTPATIENT)
Dept: RADIOLOGY | Facility: HOSPITAL | Age: 53
Discharge: HOME | End: 2023-01-27

## 2023-01-27 DIAGNOSIS — R59.9 LYMPH NODE ENLARGEMENT: ICD-10-CM

## 2023-01-27 DIAGNOSIS — R59.9 LYMPH NODE ENLARGEMENT: Primary | ICD-10-CM

## 2023-01-27 DIAGNOSIS — R22.1 NODULE OF NECK: ICD-10-CM

## 2023-02-06 ENCOUNTER — HOSPITAL ENCOUNTER (OUTPATIENT)
Dept: RADIOLOGY | Facility: HOSPITAL | Age: 53
Discharge: HOME | End: 2023-02-06

## 2023-02-06 DIAGNOSIS — R22.1 NODULE OF NECK: ICD-10-CM

## 2023-02-06 RX ADMIN — IOHEXOL 100 ML: 350 INJECTION, SOLUTION INTRAVENOUS at 08:27

## 2023-03-20 ENCOUNTER — PREP FOR PROCEDURE (OUTPATIENT)
Dept: GASTROENTEROLOGY | Facility: CLINIC | Age: 53
End: 2023-03-20

## 2023-03-20 ENCOUNTER — TELEPHONE (OUTPATIENT)
Dept: GASTROENTEROLOGY | Facility: CLINIC | Age: 53
End: 2023-03-20

## 2023-03-20 DIAGNOSIS — Z12.11 SCREENING FOR COLON CANCER: Primary | ICD-10-CM

## 2023-03-20 NOTE — TELEPHONE ENCOUNTER
Scheduled date of colonoscopy (as of today):  4/17/2023    Physician performing colonoscopy: Dr Martínez    Location of colonoscopy:  [facility]    Clearances:

## 2023-03-21 ENCOUNTER — OFFICE VISIT (OUTPATIENT)
Dept: FAMILY MEDICINE CLINIC | Facility: CLINIC | Age: 53
End: 2023-03-21

## 2023-03-21 VITALS
OXYGEN SATURATION: 96 % | DIASTOLIC BLOOD PRESSURE: 72 MMHG | TEMPERATURE: 98.7 F | RESPIRATION RATE: 14 BRPM | SYSTOLIC BLOOD PRESSURE: 152 MMHG | HEIGHT: 76 IN | WEIGHT: 315 LBS | HEART RATE: 98 BPM | BODY MASS INDEX: 38.36 KG/M2

## 2023-03-21 DIAGNOSIS — M54.12 CERVICAL RADICULOPATHY: ICD-10-CM

## 2023-03-21 DIAGNOSIS — M54.2 NECK PAIN: Primary | ICD-10-CM

## 2023-03-21 RX ORDER — CYCLOBENZAPRINE HCL 5 MG
5 TABLET ORAL
Qty: 15 TABLET | Refills: 0 | Status: SHIPPED | OUTPATIENT
Start: 2023-03-21

## 2023-03-21 RX ORDER — DEXAMETHASONE SODIUM PHOSPHATE 4 MG/ML
4 INJECTION, SOLUTION INTRA-ARTICULAR; INTRALESIONAL; INTRAMUSCULAR; INTRAVENOUS; SOFT TISSUE ONCE
Status: COMPLETED | OUTPATIENT
Start: 2023-03-21 | End: 2023-03-21

## 2023-03-21 RX ORDER — PREDNISONE 20 MG/1
TABLET ORAL
Qty: 11 TABLET | Refills: 0 | Status: SHIPPED | OUTPATIENT
Start: 2023-03-21

## 2023-03-21 RX ORDER — KETOROLAC TROMETHAMINE 30 MG/ML
60 INJECTION, SOLUTION INTRAMUSCULAR; INTRAVENOUS ONCE
Status: COMPLETED | OUTPATIENT
Start: 2023-03-21 | End: 2023-03-21

## 2023-03-21 RX ADMIN — DEXAMETHASONE SODIUM PHOSPHATE 4 MG: 4 INJECTION, SOLUTION INTRA-ARTICULAR; INTRALESIONAL; INTRAMUSCULAR; INTRAVENOUS; SOFT TISSUE at 11:04

## 2023-03-21 RX ADMIN — KETOROLAC TROMETHAMINE 60 MG: 30 INJECTION, SOLUTION INTRAMUSCULAR; INTRAVENOUS at 11:05

## 2023-03-21 NOTE — PROGRESS NOTES
Assessment/Plan:    1  Neck pain  -     dexamethasone (DECADRON) injection 4 mg  -     ketorolac (TORADOL) 60 mg/2 mL IM injection 60 mg  -     Ambulatory Referral to Physical Therapy; Future  -     predniSONE 20 mg tablet; Take 2 tablets PO daily x's 3 days, then take 1 tablet daily x's 3 days, then take 1/2 tablet daily x's 3 days  -     cyclobenzaprine (FLEXERIL) 5 mg tablet; Take 1 tablet (5 mg total) by mouth daily at bedtime as needed for muscle spasms    2  Cervical radiculopathy  -     dexamethasone (DECADRON) injection 4 mg  -     ketorolac (TORADOL) 60 mg/2 mL IM injection 60 mg  -     Ambulatory Referral to Physical Therapy; Future  -     predniSONE 20 mg tablet; Take 2 tablets PO daily x's 3 days, then take 1 tablet daily x's 3 days, then take 1/2 tablet daily x's 3 days  -     cyclobenzaprine (FLEXERIL) 5 mg tablet; Take 1 tablet (5 mg total) by mouth daily at bedtime as needed for muscle spasms          Return if symptoms worsen or fail to improve  Subjective:      Patient ID: Radu Rdz is a 50 y o  male  Chief Complaint   Patient presents with   • Shoulder Pain     Pt here for R shoulder pain- no injury  Been for a couple of weeks  Amrita Lu is a 50year old male who presents to the office for evaluation and management of right sided neck and shoulder pain  Reports the pain extends through and down his right arm  Reports that he does occasionally have numbness and tingling that extends down his right arm  Reports right shoulder pain  States that he does not remember injuring his neck shoulder or arm  The following portions of the patient's history were reviewed and updated as appropriate: allergies, current medications, past family history, past medical history, past social history, past surgical history and problem list     Review of Systems   Musculoskeletal: Positive for arthralgias (right shoulder) and neck pain  Negative for neck stiffness     Neurological: Positive for weakness and numbness  Current Outpatient Medications   Medication Sig Dispense Refill   • Ascorbic Acid (VITAMIN C) 500 MG CAPS Take 2 tablets by mouth daily     • choline fenofibrate (TRILIPIX) 135 MG capsule TAKE 1 CAPSULE DAILY     • cyanocobalamin (VITAMIN B-12) 2000 MCG tablet Take 2,000 mcg by mouth daily Taking 2500     • cyclobenzaprine (FLEXERIL) 5 mg tablet Take 1 tablet (5 mg total) by mouth daily at bedtime as needed for muscle spasms 15 tablet 0   • Multiple Vitamins-Minerals (CENTRUM ADULTS PO) Take by mouth every morning     • Multiple Vitamins-Minerals (ZINC PO) Take by mouth daily     • Omega-3 Fatty Acids (FISH OIL) 1200 MG CAPS Take 2 capsules by mouth daily     • predniSONE 20 mg tablet Take 2 tablets PO daily x's 3 days, then take 1 tablet daily x's 3 days, then take 1/2 tablet daily x's 3 days 11 tablet 0   • rosuvastatin (CRESTOR) 20 MG tablet      • torsemide (DEMADEX) 20 mg tablet Take 20 mg by mouth daily     • valsartan-hydrochlorothiazide (DIOVAN-HCT) 320-12 5 MG per tablet        No current facility-administered medications for this visit  Objective:    /72 (BP Location: Left arm, Patient Position: Sitting, Cuff Size: Extra-Large)   Pulse 98   Temp 98 7 °F (37 1 °C) (Temporal)   Resp 14   Ht 6' 4" (1 93 m)   Wt (!) 165 kg (363 lb)   SpO2 96%   BMI 44 19 kg/m²        Physical Exam  Constitutional:       General: He is not in acute distress  Appearance: He is well-developed  He is not diaphoretic  HENT:      Head: Normocephalic and atraumatic  Eyes:      General:         Right eye: No discharge  Left eye: No discharge  Conjunctiva/sclera: Conjunctivae normal    Neck:      Thyroid: No thyromegaly  Comments: Tenderness with palpation along posterior neck and shoulder  Cardiovascular:      Rate and Rhythm: Normal rate and regular rhythm  Heart sounds: Normal heart sounds     Pulmonary:      Effort: Pulmonary effort is normal  No respiratory distress  Breath sounds: Normal breath sounds  No decreased breath sounds, wheezing, rhonchi or rales  Musculoskeletal:      Cervical back: Full passive range of motion without pain, normal range of motion and neck supple  Lymphadenopathy:      Cervical: No cervical adenopathy  Skin:     General: Skin is warm and dry  Findings: No rash  Neurological:      General: No focal deficit present  Mental Status: He is alert and oriented to person, place, and time  Motor: Motor function is intact  Deep Tendon Reflexes: Reflexes are normal and symmetric  Psychiatric:         Behavior: Behavior normal          Thought Content:  Thought content normal          Judgment: Judgment normal                 GAUTAM Menon

## 2023-04-03 ENCOUNTER — TELEPHONE (OUTPATIENT)
Dept: GASTROENTEROLOGY | Facility: CLINIC | Age: 53
End: 2023-04-03

## 2023-04-03 NOTE — TELEPHONE ENCOUNTER
lmom confirming pt's colonoscopy scheduled on 4/17/23 at [facility] with Dr Javon Moreno  Informed [facility] would be calling the day prior with the arrival time  Informed of clear liquid diet day prior as well as the bowel cleansing preparation  Informed would need a  the day of the procedure due to being under sedation  Informed pt that I would be emailing him the instructions for a bowel prep that is all otc, Miralax w/ dulcolax and gatorade  I asked pt to please call back if does not receive instructions or if has any questions

## 2023-05-10 ENCOUNTER — OFFICE VISIT (OUTPATIENT)
Dept: FAMILY MEDICINE CLINIC | Facility: CLINIC | Age: 53
End: 2023-05-10

## 2023-05-10 VITALS
TEMPERATURE: 97.3 F | OXYGEN SATURATION: 95 % | HEART RATE: 108 BPM | DIASTOLIC BLOOD PRESSURE: 70 MMHG | RESPIRATION RATE: 16 BRPM | WEIGHT: 315 LBS | BODY MASS INDEX: 38.36 KG/M2 | HEIGHT: 76 IN | SYSTOLIC BLOOD PRESSURE: 130 MMHG

## 2023-05-10 DIAGNOSIS — J01.40 ACUTE NON-RECURRENT PANSINUSITIS: Primary | ICD-10-CM

## 2023-05-10 DIAGNOSIS — J30.2 SEASONAL ALLERGIES: ICD-10-CM

## 2023-05-10 RX ORDER — AMOXICILLIN AND CLAVULANATE POTASSIUM 500; 125 MG/1; MG/1
1 TABLET, FILM COATED ORAL EVERY 12 HOURS SCHEDULED
Qty: 20 TABLET | Refills: 0 | Status: SHIPPED | OUTPATIENT
Start: 2023-05-10 | End: 2023-05-20

## 2023-05-10 RX ORDER — PREDNISONE 20 MG/1
TABLET ORAL
Qty: 14 TABLET | Refills: 0 | Status: SHIPPED | OUTPATIENT
Start: 2023-05-10

## 2023-05-10 NOTE — PROGRESS NOTES
Name: Vielka Alejandro      : 1970      MRN: [id number]  Encounter Provider: Karime Gomez MD  Encounter Date: 5/10/2023   Encounter department: [address] [place] [address]     1  Acute non-recurrent pansinusitis  -     amoxicillin-clavulanate (AUGMENTIN) 500-125 mg per tablet; Take 1 tablet by mouth every 12 (twelve) hours for 10 days    2  Seasonal allergies  -     predniSONE 20 mg tablet; 2 PO QD X 4 DAYS, THEN 1 PO QD X 4 DAYS, THEN 1/2 PO QD X 4 DAYS         Subjective      Sinus Problem  This is a new problem  The current episode started 1 to 4 weeks ago  The problem has been gradually worsening since onset  There has been no fever  The pain is mild  Associated symptoms include congestion, coughing, headaches, a hoarse voice, sinus pressure, sneezing, a sore throat and swollen glands  Pertinent negatives include no chills, ear pain, neck pain or shortness of breath  Past treatments include oral decongestants  The treatment provided mild relief  Review of Systems   Constitutional: Negative for chills, fatigue and fever  HENT: Positive for congestion, hoarse voice, sinus pressure, sneezing and sore throat  Negative for ear pain  Eyes: Negative for discharge  Respiratory: Positive for cough  Negative for chest tightness and shortness of breath  Cardiovascular: Negative for chest pain and palpitations  Gastrointestinal: Negative for abdominal pain, diarrhea, nausea and vomiting  Musculoskeletal: Negative for arthralgias, gait problem and neck pain  Neurological: Positive for headaches  Negative for dizziness and weakness  Hematological: Negative for adenopathy  Psychiatric/Behavioral: The patient is not nervous/anxious          Current Outpatient Medications on File Prior to Visit   Medication Sig   • Ascorbic Acid (vitamin C) 1000 MG tablet Take 1,000 mg by mouth every morning   • choline fenofibrate (TRILIPIX) 135 MG capsule 135 mg every morning "  • cyanocobalamin (VITAMIN B-12) 2000 MCG tablet Take 2,000 mcg by mouth every morning Taking 2500   • milk thistle 175 MG tablet Take 175 mg by mouth daily   • Multiple Vitamins-Minerals (CENTRUM ADULTS PO) Take by mouth every morning   • Multiple Vitamins-Minerals (ZINC PO) Take by mouth every morning   • Omega-3 Fatty Acids (FISH OIL) 1200 MG CAPS Take 2 capsules by mouth every morning   • rosuvastatin (CRESTOR) 20 MG tablet 20 mg every morning   • TURMERIC PO Take by mouth in the morning   • valsartan-hydrochlorothiazide (DIOVAN-HCT) 320-12 5 MG per tablet 1 tablet every morning   • torsemide (DEMADEX) 20 mg tablet Take 20 mg by mouth every morning       Objective     /70 (BP Location: Right arm, Patient Position: Sitting, Cuff Size: Large)   Pulse (!) 108   Temp (!) 97 3 °F (36 3 °C) (Temporal)   Resp 16   Ht 6' 4\" (1 93 m)   Wt (!) 160 kg (352 lb)   SpO2 95%   BMI 42 85 kg/m²     Physical Exam  Vitals and nursing note reviewed  Constitutional:       Appearance: He is well-developed  HENT:      Head: Normocephalic and atraumatic  Right Ear: No drainage  A middle ear effusion is present  Tympanic membrane is not erythematous or bulging  Left Ear: No drainage  A middle ear effusion is present  Tympanic membrane is not erythematous or bulging  Nose: Mucosal edema and rhinorrhea present  Mouth/Throat:      Pharynx: Uvula midline  Posterior oropharyngeal erythema present  No oropharyngeal exudate  Eyes:      General:         Right eye: No discharge  Left eye: No discharge  Pupils: Pupils are equal, round, and reactive to light  Cardiovascular:      Rate and Rhythm: Normal rate and regular rhythm  Heart sounds: Normal heart sounds  No murmur heard  Pulmonary:      Effort: Pulmonary effort is normal       Breath sounds: Normal breath sounds  No wheezing or rales  Abdominal:      General: Bowel sounds are normal       Palpations: Abdomen is soft        " Tenderness: There is no abdominal tenderness  Musculoskeletal:         General: Normal range of motion  Cervical back: Normal range of motion and neck supple  Lymphadenopathy:      Cervical: No cervical adenopathy  Skin:     General: Skin is warm and dry  Findings: No rash  Neurological:      Mental Status: He is alert and oriented to person, place, and time  Psychiatric:         Behavior: Behavior normal          Thought Content:  Thought content normal          Judgment: Judgment normal        Mayo Marie MD

## 2023-06-28 ENCOUNTER — TELEPHONE (OUTPATIENT)
Dept: GASTROENTEROLOGY | Facility: CLINIC | Age: 53
End: 2023-06-28

## 2023-06-28 NOTE — TELEPHONE ENCOUNTER
----- Message from Hill Swain MD sent at 5/16/2023  9:56 AM EDT -----  Please schedule outpatient office visit

## 2023-08-04 ENCOUNTER — OFFICE VISIT (OUTPATIENT)
Dept: GASTROENTEROLOGY | Facility: CLINIC | Age: 53
End: 2023-08-04
Payer: COMMERCIAL

## 2023-08-04 VITALS
HEART RATE: 83 BPM | BODY MASS INDEX: 37.19 KG/M2 | DIASTOLIC BLOOD PRESSURE: 79 MMHG | SYSTOLIC BLOOD PRESSURE: 132 MMHG | HEIGHT: 77 IN | WEIGHT: 315 LBS

## 2023-08-04 DIAGNOSIS — D12.6 COLON ADENOMA: ICD-10-CM

## 2023-08-04 DIAGNOSIS — R79.89 LFT ELEVATION: Primary | ICD-10-CM

## 2023-08-04 PROCEDURE — 99213 OFFICE O/P EST LOW 20 MIN: CPT | Performed by: INTERNAL MEDICINE

## 2023-08-04 NOTE — PROGRESS NOTES
[facility] - Outpatient Follow-up Note  Eusebio Su 48 y.o. male MRN: [id number]  Encounter: [id number]          ASSESSMENT AND PLAN:      1. LFT elevation  Likely steatohepatitis primarily secondary to alcohol. Again discussed minimizing alcohol intake and will evaluate further as below for other possible contributors. - Celiac Disease Antibody Profile; Future  - Antinuclear Antibodies (LISA), IFA; Future  - Antimitochondrial antibody; Future  - Anti-smooth muscle antibody, IgG; Future  - Iron Panel (Includes Ferritin, Iron Sat%, Iron, and TIBC); Future  - Chronic Hepatitis Panel; Future  - Ceruloplasmin; Future  - Alpha-1-antitrypsin; Future  - Protein electrophoresis, serum; Future    - US elastography; Future    2. Colon adenoma  Repeat colonoscopy in 3 years    ______________________________________________________________________    SUBJECTIVE: Patient recently underwent colonoscopy for screening and noted to have multiple medium and large adenomas removed. Also has had transaminase elevation as far back as 2016 with prior diagnosis of nonalcoholic steatohepatitis. Patient does report considerable alcohol consumption up to 10 beers per day but has been working on cutting this back. No known family history of liver disease. REVIEW OF SYSTEMS:    Review of Systems   Gastrointestinal: Positive for abdominal pain. All other systems reviewed and are negative.    Answers for HPI/ROS submitted by the patient on 8/3/2023  Diagnostic workup: lower endoscopy          Historical Information   Past Medical History:   Diagnosis Date   • Colon cancer screening    • Family history of colonic polyps    • Fatty liver    • Hemorrhoids     last assessed-9/12/2014   • Hernia 2012   • Hyperlipidemia     last assessed-11/7/2017   • Hypertension    • Morbid obesity with BMI of 40.0-44.9, adult [facility])      Past Surgical History:   Procedure Laterality Date   • COLONOSCOPY  4/2023   • FOOT SURGERY Left 2018    fusion-cleaned out arthritis used cadaver bones-plate/screws   • HAND FRACTURE REPAIR Left 1990   • HERNIA REPAIR  2012   • CO REPAIR FIRST ABDOMINAL WALL HERNIA N/A 10/09/2017    Procedure: REPAIR HERNIA VENTRAL WITH MESH;  Surgeon: Aline Simms MD;  Location: [facility];  Service: General   • VASECTOMY      surgery vas deferens vasectomy     Social History   Social History     Substance and Sexual Activity   Alcohol Use Yes    Comment: socially 4x week. .. Social History     Substance and Sexual Activity   Drug Use No     Social History     Tobacco Use   Smoking Status Never   Smokeless Tobacco Former   • Types: Snuff, Chew   • Quit date: 12/12/1998     Family History   Problem Relation Age of Onset   • Heart disease Mother         cardiac disorder   • Heart failure Mother         CHF   • Melanoma Mother    • Colon cancer Mother    • Heart disease Father         cardiac disorder   • Colon cancer Brother    • Heart disease Brother         heart artery stent   • Substance Abuse Neg Hx    • Mental illness Neg Hx        Meds/Allergies       Current Outpatient Medications:   •  Ascorbic Acid (vitamin C) 1000 MG tablet  •  choline fenofibrate (TRILIPIX) 135 MG capsule  •  cyanocobalamin (VITAMIN B-12) 2000 MCG tablet  •  milk thistle 175 MG tablet  •  Multiple Vitamins-Minerals (CENTRUM ADULTS PO)  •  Multiple Vitamins-Minerals (ZINC PO)  •  Omega-3 Fatty Acids (FISH OIL) 1200 MG CAPS  •  rosuvastatin (CRESTOR) 20 MG tablet  •  TURMERIC PO  •  valsartan-hydrochlorothiazide (DIOVAN-HCT) 320-12.5 MG per tablet  •  predniSONE 20 mg tablet  •  torsemide (DEMADEX) 20 mg tablet    No Known Allergies        Objective     Blood pressure 132/79, pulse 83, height 6' 5" (1.956 m), weight (!) 156 kg (345 lb). Body mass index is 40.91 kg/m². PHYSICAL EXAM:      Physical Exam  Vitals and nursing note reviewed. Constitutional:       General: He is not in acute distress. Appearance: He is obese. HENT:      Head: Normocephalic and atraumatic. Mouth/Throat:      Mouth: Mucous membranes are moist.   Eyes:      General: No scleral icterus. Pupils: Pupils are equal, round, and reactive to light. Cardiovascular:      Rate and Rhythm: Normal rate. Pulmonary:      Effort: Pulmonary effort is normal. No respiratory distress. Abdominal:      General: There is no distension. Musculoskeletal:         General: Normal range of motion. Cervical back: Normal range of motion and neck supple. Skin:     General: Skin is warm and dry. Neurological:      General: No focal deficit present. Mental Status: He is alert and oriented to person, place, and time. Psychiatric:         Mood and Affect: Mood normal.         Behavior: Behavior normal.          Lab Results:   No visits with results within 1 Day(s) from this visit. Latest known visit with results is:   Hospital Outpatient Visit on 04/17/2023   Component Date Value   • Case Report 04/17/2023                      Value:Surgical Pathology Report                         Case: [id number]                                   Authorizing Provider:  Libbie Boxer, MD         Collected:           04/17/2023 0820              Ordering Location:     [facility]   Received:            04/17/2023 [address] [place] [address]                                                                       Pathologist:           Sarah Beth Ahumada DO                                                            Specimens:   A) - Large Intestine, Right/Ascending Colon, cold/hot snare polyp x3                                B) - Polyp, Colorectal, rectal polyp hot snare                                            • Final Diagnosis 04/17/2023                      Value: This result contains rich text formatting which cannot be displayed here. • Additional Information 04/17/2023                      Value: This result contains rich text formatting which cannot be displayed here. • Synoptic Checklist 04/17/2023                      Value:                            COLON/RECTUM POLYP FORM - GI - All Specimens                                                                                     :    Adenoma(s)     • Gross Description 04/17/2023                      Value: This result contains rich text formatting which cannot be displayed here. Radiology Results:   No results found.

## 2023-08-28 LAB — HCV AB SER-ACNC: NORMAL

## 2023-09-27 ENCOUNTER — HOSPITAL ENCOUNTER (OUTPATIENT)
Dept: RADIOLOGY | Facility: HOSPITAL | Age: 53
Discharge: HOME | End: 2023-09-27
Attending: INTERNAL MEDICINE
Payer: COMMERCIAL

## 2023-09-27 DIAGNOSIS — R79.89 LFT ELEVATION: ICD-10-CM

## 2023-09-27 PROCEDURE — 76981 USE PARENCHYMA: CPT

## 2023-12-07 ENCOUNTER — PREP FOR PROCEDURE (OUTPATIENT)
Dept: INTERVENTIONAL RADIOLOGY/VASCULAR | Facility: CLINIC | Age: 53
End: 2023-12-07

## 2023-12-07 ENCOUNTER — OFFICE VISIT (OUTPATIENT)
Dept: GASTROENTEROLOGY | Facility: CLINIC | Age: 53
End: 2023-12-07
Payer: COMMERCIAL

## 2023-12-07 VITALS
HEART RATE: 74 BPM | SYSTOLIC BLOOD PRESSURE: 157 MMHG | HEIGHT: 76 IN | BODY MASS INDEX: 38.36 KG/M2 | DIASTOLIC BLOOD PRESSURE: 81 MMHG | WEIGHT: 315 LBS

## 2023-12-07 DIAGNOSIS — K75.81 STEATOHEPATITIS: Primary | ICD-10-CM

## 2023-12-07 DIAGNOSIS — K75.81 NASH (NONALCOHOLIC STEATOHEPATITIS): Primary | ICD-10-CM

## 2023-12-07 PROCEDURE — 99213 OFFICE O/P EST LOW 20 MIN: CPT | Performed by: INTERNAL MEDICINE

## 2023-12-07 NOTE — PROGRESS NOTES
[facility] - Outpatient Follow-up Note  Pietro Meyers 48 y.o. male MRN: [id number]  Encounter: [id number]          ASSESSMENT AND PLAN:      1. Steatohepatitis  Concern for possible underlying cirrhosis with prior thrombocytopenia. Will plan liver biopsy for further evaluation. Discussed complete alcohol abstinence. Will continue to work toward weight loss. Further recommendations will be based on biopsy results. - US guided liver biopsy; Future  - CBC; Future  - Hepatic function panel; Future  - Protime-INR; Future  - CBC  - Hepatic function panel  - Protime-INR  - Ambulatory Referral to Interventional Radiology; Future    ______________________________________________________________________    SUBJECTIVELenell Margareth returns in follow-up of alcoholic steatohepatitis. Further evaluation for other underlying conditions has been negative. Elastography was inconclusive. Feeling well with no symptoms. Has dramatically reduced alcohol intake      REVIEW OF SYSTEMS:    Review of Systems   Gastrointestinal:  Positive for abdominal pain.           Historical Information   Past Medical History:   Diagnosis Date    Colon cancer screening     Family history of colonic polyps     Fatty liver     Hemorrhoids     last assessed-9/12/2014    Hernia 2012    Hyperlipidemia     last assessed-11/7/2017    Hypertension     Morbid obesity with BMI of 40.0-44.9, adult [facility])      Past Surgical History:   Procedure Laterality Date    COLONOSCOPY  4/2023    FOOT SURGERY Left 2018    fusion-cleaned out arthritis used cadaver bones-plate/screws    HAND FRACTURE REPAIR Left 1990    HERNIA REPAIR  2012    MS REPAIR FIRST ABDOMINAL WALL HERNIA N/A 10/09/2017    Procedure: REPAIR HERNIA VENTRAL WITH MESH;  Surgeon: Marycarmen Plaza MD;  Location: [facility];  Service: General    VASECTOMY      surgery vas deferens vasectomy     Social History   Social History     Substance and Sexual Activity   Alcohol Use Yes Comment: socially 4x week. .. Social History     Substance and Sexual Activity   Drug Use No     Social History     Tobacco Use   Smoking Status Never   Smokeless Tobacco Former    Types: Snuff, Chew    Quit date: 12/12/1998     Family History   Problem Relation Age of Onset    Heart disease Mother         cardiac disorder    Heart failure Mother         CHF    Melanoma Mother     Colon cancer Mother     Heart disease Father         cardiac disorder    Colon cancer Brother     Heart disease Brother         heart artery stent    Substance Abuse Neg Hx     Mental illness Neg Hx        Meds/Allergies       Current Outpatient Medications:     Ascorbic Acid (vitamin C) 1000 MG tablet    choline fenofibrate (TRILIPIX) 135 MG capsule    cyanocobalamin (VITAMIN B-12) 2000 MCG tablet    milk thistle 175 MG tablet    Multiple Vitamins-Minerals (CENTRUM ADULTS PO)    Multiple Vitamins-Minerals (ZINC PO)    Omega-3 Fatty Acids (FISH OIL) 1200 MG CAPS    rosuvastatin (CRESTOR) 20 MG tablet    torsemide (DEMADEX) 20 mg tablet    TURMERIC PO    valsartan-hydrochlorothiazide (DIOVAN-HCT) 320-12.5 MG per tablet    No Known Allergies        Objective     Blood pressure 157/81, pulse 74, height 6' 4" (1.93 m), weight (!) 164 kg (361 lb 8.9 oz). Body mass index is 44.01 kg/m². PHYSICAL EXAM:      Physical Exam  Vitals and nursing note reviewed. Constitutional:       General: He is not in acute distress. Appearance: He is obese. HENT:      Head: Normocephalic and atraumatic. Mouth/Throat:      Mouth: Mucous membranes are moist.   Eyes:      General: No scleral icterus. Pupils: Pupils are equal, round, and reactive to light. Cardiovascular:      Rate and Rhythm: Normal rate. Pulmonary:      Effort: Pulmonary effort is normal. No respiratory distress. Abdominal:      General: There is no distension. Palpations: Abdomen is soft. Musculoskeletal:         General: Normal range of motion.       Cervical back: Normal range of motion and neck supple. Skin:     General: Skin is warm and dry. Neurological:      General: No focal deficit present. Mental Status: He is alert and oriented to person, place, and time. Psychiatric:         Mood and Affect: Mood normal.         Behavior: Behavior normal.          Lab Results:   No visits with results within 1 Day(s) from this visit. Latest known visit with results is:   Orders Only on 08/28/2023   Component Date Value    HEP C AB 08/28/2023 NON-REACTIVE          Radiology Results:   No results found.

## 2023-12-15 ENCOUNTER — TELEPHONE (OUTPATIENT)
Dept: GASTROENTEROLOGY | Facility: CLINIC | Age: 53
End: 2023-12-15

## 2023-12-15 NOTE — TELEPHONE ENCOUNTER
----- Message from Cheryl Herzog MD sent at 12/14/2023  4:30 PM EST -----  Regarding: RE: Pt has not returned calls to schedule biopsy  Thank you. I will ask my office staff to reach out to him and have him contact you.    ----- Message -----  From: Jarad Gomez  Sent: 12/14/2023   8:26 AM EST  To: Cheryl Herzog MD; Ir Central Scheduling  Subject: Pt has not returned calls to schedule biopsy     Good morning-  Truman Archuleta has not returned our calls to schedule the Random Liver BX ordered by you. Could you please reach out to Truman Archuleta and ask him to call IR scheduling at [phone]. We will hold the block until tomorrow and then remove for another patient to be scheduled. Thank you!

## 2023-12-18 ENCOUNTER — TELEPHONE (OUTPATIENT)
Dept: RADIOLOGY | Facility: HOSPITAL | Age: 53
End: 2023-12-18

## 2023-12-18 NOTE — TELEPHONE ENCOUNTER
Request Created 2023 15:22 Duarte Galvan MD        Assigned to User 2023 15:26 Hiwot Martinezrafat WQ: IMG IR OUTPT ORDERS IN LAST 120 DAYS, Assigned to Hiwot Martinezrafat      Notes Edited 2023 15:27 Hiwot Lea        Patient Called 2023 09:09 Hiwot Leonora Left Message: Details      Request Deferred 2023 09:09 Hiwot Lea Deferred until 2023, Called 1x      Request Deferral  2023 00:01 Schedq Background User        Patient Called 2023 09:23 Hiwot Lea Left Message: Details      Request Deferred 2023 09:23 Hiwot Lea Deferred until 2023, Called 2x      Request Deferral  2023 00:01 Schedq Background User        Request Deferred 2023 08:27 Hiwot Leonora Deferred until 12/15/2023, Other - See Notes  Msg sent to provider- no return calls- hold block until 12/15//AH      Request Deferral  12/15/2023 00:01 Schedq Background User        Request Deferred 12/15/2023 08:12 Hiwot Donjuanitarafat Deferred until 2023, Other - See Notes  Msg from provider says they will have pt call to schedule      Notes Edited 12/15/2023 14:17 Mayra Rodriguez        Notes Edited 12/15/2023 14:18 Hiwot Lea        Request Deferral  2023 00:01 Schedq Background User        Notes Edited 2023 08:12 Hiwot Lea

## 2023-12-19 NOTE — TELEPHONE ENCOUNTER
ROZ CABELLO called and spoke to patient and advised him to call IR department at the number provided in previous message to schedule liver bx. He verbalized understanding.

## 2023-12-28 DIAGNOSIS — Z12.5 SCREENING FOR PROSTATE CANCER: ICD-10-CM

## 2023-12-28 DIAGNOSIS — I10 ESSENTIAL HYPERTENSION: Primary | ICD-10-CM

## 2023-12-28 DIAGNOSIS — E78.2 MIXED HYPERLIPIDEMIA: ICD-10-CM

## 2023-12-28 DIAGNOSIS — Z00.00 ENCOUNTER FOR ANNUAL GENERAL MEDICAL EXAMINATION WITHOUT ABNORMAL FINDINGS IN ADULT: ICD-10-CM

## 2023-12-28 DIAGNOSIS — Z13.29 SCREENING FOR HYPOTHYROIDISM: ICD-10-CM

## 2024-01-04 ENCOUNTER — TELEPHONE (OUTPATIENT)
Age: 54
End: 2024-01-04

## 2024-01-04 NOTE — TELEPHONE ENCOUNTER
Pt. Is going to stop later on today for his labs that he has to get done for his next appt. He goes to [facility].

## 2024-01-09 LAB
BASOPHILS # BLD AUTO: 0 X10E3/UL (ref 0–0.2)
BASOPHILS NFR BLD AUTO: 1 %
EOSINOPHIL # BLD AUTO: 0.3 X10E3/UL (ref 0–0.4)
EOSINOPHIL NFR BLD AUTO: 5 %
ERYTHROCYTE [DISTWIDTH] IN BLOOD BY AUTOMATED COUNT: 13.2 % (ref 11.6–15.4)
HCT VFR BLD AUTO: 39.5 % (ref 37.5–51)
HGB BLD-MCNC: 13.7 G/DL (ref 13–17.7)
IMM GRANULOCYTES # BLD: 0 X10E3/UL (ref 0–0.1)
IMM GRANULOCYTES NFR BLD: 0 %
LYMPHOCYTES # BLD AUTO: 2.2 X10E3/UL (ref 0.7–3.1)
LYMPHOCYTES NFR BLD AUTO: 37 %
MCH RBC QN AUTO: 31.1 PG (ref 26.6–33)
MCHC RBC AUTO-ENTMCNC: 34.7 G/DL (ref 31.5–35.7)
MCV RBC AUTO: 90 FL (ref 79–97)
MONOCYTES # BLD AUTO: 0.4 X10E3/UL (ref 0.1–0.9)
MONOCYTES NFR BLD AUTO: 6 %
NEUTROPHILS # BLD AUTO: 3 X10E3/UL (ref 1.4–7)
NEUTROPHILS NFR BLD AUTO: 51 %
PLATELET # BLD AUTO: 142 X10E3/UL (ref 150–450)
RBC # BLD AUTO: 4.41 X10E6/UL (ref 4.14–5.8)
WBC # BLD AUTO: 5.9 X10E3/UL (ref 3.4–10.8)

## 2024-01-10 LAB
ALBUMIN SERPL-MCNC: 4.6 G/DL (ref 3.8–4.9)
ALBUMIN/GLOB SERPL: 1.5 {RATIO} (ref 1.2–2.2)
ALP SERPL-CCNC: 66 IU/L (ref 44–121)
ALT SERPL-CCNC: 54 IU/L (ref 0–44)
AST SERPL-CCNC: 74 IU/L (ref 0–40)
BILIRUB SERPL-MCNC: 0.4 MG/DL (ref 0–1.2)
BUN SERPL-MCNC: 10 MG/DL (ref 6–24)
BUN/CREAT SERPL: 12 (ref 9–20)
CALCIUM SERPL-MCNC: 9.5 MG/DL (ref 8.7–10.2)
CHLORIDE SERPL-SCNC: 100 MMOL/L (ref 96–106)
CHOLEST SERPL-MCNC: 137 MG/DL (ref 100–199)
CHOLEST/HDLC SERPL: 4.7 RATIO (ref 0–5)
CO2 SERPL-SCNC: 20 MMOL/L (ref 20–29)
CREAT SERPL-MCNC: 0.84 MG/DL (ref 0.76–1.27)
EGFR: 106 ML/MIN/1.73
GLOBULIN SER-MCNC: 3.1 G/DL (ref 1.5–4.5)
GLUCOSE SERPL-MCNC: 145 MG/DL (ref 70–99)
HDLC SERPL-MCNC: 29 MG/DL
LDLC SERPL CALC-MCNC: 62 MG/DL (ref 0–99)
LDLC SERPL DIRECT ASSAY-MCNC: 60 MG/DL (ref 0–99)
POTASSIUM SERPL-SCNC: 4.1 MMOL/L (ref 3.5–5.2)
PROT SERPL-MCNC: 7.7 G/DL (ref 6–8.5)
PSA SERPL-MCNC: 0.5 NG/ML (ref 0–4)
SL AMB REFLEX CRITERIA: NORMAL
SL AMB VLDL CHOLESTEROL CALC: 46 MG/DL (ref 5–40)
SODIUM SERPL-SCNC: 139 MMOL/L (ref 134–144)
TRIGL SERPL-MCNC: 292 MG/DL (ref 0–149)
TSH SERPL DL<=0.005 MIU/L-ACNC: 2.42 UIU/ML (ref 0.45–4.5)

## 2024-01-22 ENCOUNTER — OFFICE VISIT (OUTPATIENT)
Dept: FAMILY MEDICINE CLINIC | Facility: CLINIC | Age: 54
End: 2024-01-22
Payer: COMMERCIAL

## 2024-01-22 VITALS
WEIGHT: 315 LBS | DIASTOLIC BLOOD PRESSURE: 72 MMHG | OXYGEN SATURATION: 96 % | HEART RATE: 81 BPM | TEMPERATURE: 97.8 F | SYSTOLIC BLOOD PRESSURE: 142 MMHG | HEIGHT: 75 IN | RESPIRATION RATE: 20 BRPM | BODY MASS INDEX: 39.17 KG/M2

## 2024-01-22 DIAGNOSIS — Z23 ENCOUNTER FOR IMMUNIZATION: ICD-10-CM

## 2024-01-22 DIAGNOSIS — R73.03 PREDIABETES: ICD-10-CM

## 2024-01-22 DIAGNOSIS — R73.01 ELEVATED FASTING BLOOD SUGAR: ICD-10-CM

## 2024-01-22 DIAGNOSIS — Z00.00 ENCOUNTER FOR ANNUAL GENERAL MEDICAL EXAMINATION WITHOUT ABNORMAL FINDINGS IN ADULT: Primary | ICD-10-CM

## 2024-01-22 DIAGNOSIS — E78.2 MIXED HYPERLIPIDEMIA: ICD-10-CM

## 2024-01-22 DIAGNOSIS — K75.81 NASH (NONALCOHOLIC STEATOHEPATITIS): ICD-10-CM

## 2024-01-22 DIAGNOSIS — E66.01 CLASS 3 SEVERE OBESITY DUE TO EXCESS CALORIES WITH SERIOUS COMORBIDITY AND BODY MASS INDEX (BMI) OF 45.0 TO 49.9 IN ADULT (HCC): ICD-10-CM

## 2024-01-22 DIAGNOSIS — D69.6 LOW PLATELET COUNT (HCC): ICD-10-CM

## 2024-01-22 DIAGNOSIS — I10 ESSENTIAL HYPERTENSION: ICD-10-CM

## 2024-01-22 DIAGNOSIS — Z12.11 ENCOUNTER FOR SCREENING FECAL OCCULT BLOOD TESTING: ICD-10-CM

## 2024-01-22 PROBLEM — G62.9 NEUROPATHY: Status: ACTIVE | Noted: 2023-05-19

## 2024-01-22 LAB
SL AMB POCT FECES OCC BLD: NEGATIVE
SL AMB POCT HEMOGLOBIN AIC: 5.8 (ref ?–6.5)

## 2024-01-22 PROCEDURE — 90750 HZV VACC RECOMBINANT IM: CPT

## 2024-01-22 PROCEDURE — 99396 PREV VISIT EST AGE 40-64: CPT | Performed by: NURSE PRACTITIONER

## 2024-01-22 PROCEDURE — 90471 IMMUNIZATION ADMIN: CPT

## 2024-01-22 PROCEDURE — 83036 HEMOGLOBIN GLYCOSYLATED A1C: CPT | Performed by: NURSE PRACTITIONER

## 2024-01-22 PROCEDURE — 82270 OCCULT BLOOD FECES: CPT | Performed by: NURSE PRACTITIONER

## 2024-01-22 NOTE — PROGRESS NOTES
[facility] HEALTH MAINTENANCE OFFICE VISIT  [facility]    NAME: Pranay Pearson  AGE: 53 y.o. SEX: male  : 1970     DATE: 2024    Assessment and Plan     1. Encounter for annual general medical examination without abnormal findings in adult  Comments:  Age appropriate screenings and recommendations discussed. Fasting labs reviewed.    2. OSMAN (nonalcoholic steatohepatitis)  -     Ambulatory Referral to Hepatology; Future    3. Low platelet count (HCC)  -     Ambulatory Referral to Hepatology; Future    4. Class 3 severe obesity due to excess calories with serious comorbidity and body mass index (BMI) of 45.0 to 49.9 in adult (HCC)  Assessment & Plan:  Encourage regular exercise, nutrition and weight loss.      5. Mixed hyperlipidemia  Assessment & Plan:  Reviewed lipid panel with patient in office. Cardiology following. Overall improvement in triglycerides.  Taking atorvastatin and trilipix.   Recheck in 3 months.       6. Essential hypertension  Assessment & Plan:  BP mildly elevated in office today. Continue medications as directed. Following up with cardiology.       7. Encounter for immunization  -     Zoster Vaccine Recombinant IM    8. Elevated fasting blood sugar  -     POCT hemoglobin A1c        Patient Counseling:   Nutrition: Stressed importance of a well balanced diet, moderation of sodium/saturated fat, caloric balance and sufficient intake of fiber  Exercise: Stressed the importance of regular exercise with a goal of 150 minutes per week  Dental Health: Discussed daily flossing and brushing and regular dental visits   Alcohol Use:  Recommended moderation of alcohol intake  Injury Prevention: Discussed Safety Belts, Safety Helmets, and Smoke Detectors    Immunizations reviewed: See Orders and Risks and Benefits discussed  Discussed benefits of:  Colon Cancer Screening, Prostate Cancer Screening , and Screening labs.  BMI Counseling:  Body mass index is 45.12 kg/m². Discussed with patient's BMI with him. The BMI is above normal. Nutrition recommendations include reducing portion sizes, decreasing overall calorie intake, 3-5 servings of fruits/vegetables daily, reducing fast food intake, consuming healthier snacks, decreasing soda and/or juice intake, moderation in carbohydrate intake, increasing intake of lean protein, reducing intake of saturated fat and trans fat, and reducing intake of cholesterol. Exercise recommendations include exercising 3-5 times per week.    No follow-ups on file.        Chief Complaint     Chief Complaint   Patient presents with    Physical Exam       History of Present Illness     HPI    Well Adult Physical   Patient here for a comprehensive physical exam.      Diet and Physical Activity  Diet: poor diet  Exercise: occasionally      Depression Screen  PHQ-2/9 Depression Screening    Little interest or pleasure in doing things: 0 - not at all  Feeling down, depressed, or hopeless: 0 - not at all  PHQ-2 Score: 0  PHQ-2 Interpretation: Negative depression screen          General Health  Hearing: Normal:  bilateral  Vision: goes for regular eye exams  Dental: regular dental visits    Reproductive Health  No issues       The following portions of the patient's history were reviewed and updated as appropriate: allergies, current medications, past family history, past medical history, past social history, past surgical history and problem list.    Review of Systems     Review of Systems   Constitutional:  Negative for diaphoresis, fatigue and fever.   HENT:  Negative for ear pain and hearing loss.    Eyes:  Negative for pain and visual disturbance.   Respiratory:  Negative for chest tightness and shortness of breath.    Cardiovascular:  Negative for chest pain, palpitations and leg swelling.   Gastrointestinal:  Negative for abdominal pain, constipation and diarrhea.   Genitourinary:  Negative for difficulty urinating.    Musculoskeletal:  Negative for arthralgias and myalgias.   Skin:  Negative for rash.   Neurological:  Negative for dizziness, numbness and headaches.   Psychiatric/Behavioral:  Negative for sleep disturbance.        Past Medical History     Past Medical History:   Diagnosis Date    Colon cancer screening     Family history of colonic polyps     Fatty liver     Hemorrhoids     last assessed-9/12/2014    Hernia 2012    Hyperlipidemia     last assessed-11/7/2017    Hypertension     Morbid obesity with BMI of 40.0-44.9, adult (HCC)        Past Surgical History     Past Surgical History:   Procedure Laterality Date    COLONOSCOPY  4/2023    FOOT SURGERY Left 2018    fusion-cleaned out arthritis used cadaver bones-plate/screws    HAND FRACTURE REPAIR Left 1990    HERNIA REPAIR  2012    [place] REPAIR FIRST ABDOMINAL WALL HERNIA N/A 10/09/2017    Procedure: REPAIR HERNIA VENTRAL WITH MESH;  Surgeon: Yoshi Andrews MD;  Location: [facility];  Service: General    VASECTOMY      surgery vas deferens vasectomy       Social History     Social History     Socioeconomic History    Marital status: /Civil Union     Spouse name: None    Number of children: None    Years of education: None    Highest education level: None   Occupational History    None   Tobacco Use    Smoking status: Never    Smokeless tobacco: Former     Types: Snuff, Chew     Quit date: 12/12/1998   Vaping Use    Vaping status: Never Used   Substance and Sexual Activity    Alcohol use: Yes     Comment: socially 4x week...     Drug use: No    Sexual activity: Yes     Partners: Female   Other Topics Concern    None   Social History Narrative    Dental care, regularly    No caffeine use     Social Determinants of Health     Financial Resource Strain: Not on file   Food Insecurity: Not on file   Transportation Needs: Not on file   Physical Activity: Not on file   Stress: Not on file   Social Connections: Not on file   Intimate Partner Violence: Not on file  "  Housing Stability: Not on file       Family History     Family History   Problem Relation Age of Onset    Heart disease Mother         cardiac disorder    Heart failure Mother         CHF    Melanoma Mother     Colon cancer Mother     Heart disease Father         cardiac disorder    Colon cancer Brother     Heart disease Brother         heart artery stent    Substance Abuse Neg Hx     Mental illness Neg Hx        Current Medications       Current Outpatient Medications:     Ascorbic Acid (vitamin C) 1000 MG tablet, Take 1,000 mg by mouth every morning, Disp: , Rfl:     choline fenofibrate (TRILIPIX) 135 MG capsule, 135 mg every morning, Disp: , Rfl:     cyanocobalamin (VITAMIN B-12) 2000 MCG tablet, Take 2,000 mcg by mouth every morning Taking 2500, Disp: , Rfl:     milk thistle 175 MG tablet, Take 175 mg by mouth daily, Disp: , Rfl:     Multiple Vitamins-Minerals (CENTRUM ADULTS PO), Take by mouth every morning, Disp: , Rfl:     Multiple Vitamins-Minerals (ZINC PO), Take by mouth every morning, Disp: , Rfl:     Omega-3 Fatty Acids (FISH OIL) 1200 MG CAPS, Take 2 capsules by mouth every morning, Disp: , Rfl:     rosuvastatin (CRESTOR) 20 MG tablet, 20 mg every morning, Disp: , Rfl:     TURMERIC PO, Take by mouth in the morning, Disp: , Rfl:     valsartan-hydrochlorothiazide (DIOVAN-HCT) 320-12.5 MG per tablet, 1 tablet every morning, Disp: , Rfl:     torsemide (DEMADEX) 20 mg tablet, Take 20 mg by mouth every morning, Disp: , Rfl:      Allergies     No Known Allergies    Objective     /72 (BP Location: Right arm, Patient Position: Sitting, Cuff Size: Large)   Pulse 81   Temp 97.8 °F (36.6 °C) (Temporal)   Resp 20   Ht 6' 3\" (1.905 m)   Wt (!) 164 kg (361 lb)   SpO2 96%   BMI 45.12 kg/m²      Physical Exam  Vitals reviewed.   Constitutional:       General: He is not in acute distress.     Appearance: Normal appearance. He is well-developed. He is not diaphoretic.   HENT:      Head: Normocephalic and " atraumatic.      Right Ear: Tympanic membrane, ear canal and external ear normal.      Left Ear: Tympanic membrane, ear canal and external ear normal.   Eyes:      General: Lids are normal.      Extraocular Movements: Extraocular movements intact.      Conjunctiva/sclera: Conjunctivae normal.      Pupils: Pupils are equal, round, and reactive to light. Pupils are equal.      Funduscopic exam:     Right eye: Red reflex present.         Left eye: Red reflex present.  Neck:      Thyroid: No thyroid mass or thyromegaly.      Vascular: No carotid bruit.      Trachea: No tracheal deviation.   Cardiovascular:      Rate and Rhythm: Normal rate and regular rhythm.      Pulses: Normal pulses.      Heart sounds: Normal heart sounds, S1 normal and S2 normal. No murmur heard.  Pulmonary:      Effort: Pulmonary effort is normal.      Breath sounds: Normal breath sounds. No decreased breath sounds, wheezing, rhonchi or rales.   Abdominal:      General: Bowel sounds are normal. There is distension (obese).      Palpations: Abdomen is soft. There is no hepatomegaly or splenomegaly.      Tenderness: There is no abdominal tenderness.   Genitourinary:     Prostate: Normal. Not enlarged.      Rectum: Guaiac result negative.   Musculoskeletal:         General: No tenderness or deformity. Normal range of motion.      Cervical back: Full passive range of motion without pain, normal range of motion and neck supple.   Lymphadenopathy:      Cervical: No cervical adenopathy.      Upper Body:      Right upper body: No supraclavicular adenopathy.      Left upper body: No supraclavicular adenopathy.   Skin:     General: Skin is warm and dry.      Findings: No rash.   Neurological:      General: No focal deficit present.      Mental Status: He is alert and oriented to person, place, and time.      Cranial Nerves: No cranial nerve deficit.      Motor: Motor function is intact.      Coordination: Coordination normal.      Deep Tendon Reflexes:  Reflexes are normal and symmetric.   Psychiatric:         Speech: Speech normal.         Behavior: Behavior normal.         Thought Content: Thought content normal.         Judgment: Judgment normal.           GAUTAM Mares  [facility]

## 2024-01-22 NOTE — ASSESSMENT & PLAN NOTE
Reviewed lipid panel with patient in office. Cardiology following. Overall improvement in triglycerides.  Taking atorvastatin and trilipix.   Recheck in 3 months.

## 2024-01-22 NOTE — ASSESSMENT & PLAN NOTE
BP mildly elevated in office today. Continue medications as directed. Following up with cardiology.

## 2024-02-23 ENCOUNTER — OFFICE VISIT (OUTPATIENT)
Dept: GASTROENTEROLOGY | Facility: CLINIC | Age: 54
End: 2024-02-23
Payer: COMMERCIAL

## 2024-02-23 VITALS
DIASTOLIC BLOOD PRESSURE: 88 MMHG | BODY MASS INDEX: 39.17 KG/M2 | HEART RATE: 78 BPM | HEIGHT: 75 IN | TEMPERATURE: 97.8 F | WEIGHT: 315 LBS | SYSTOLIC BLOOD PRESSURE: 136 MMHG

## 2024-02-23 DIAGNOSIS — D69.6 THROMBOCYTOPENIA (HCC): ICD-10-CM

## 2024-02-23 DIAGNOSIS — R79.89 LFT ELEVATION: ICD-10-CM

## 2024-02-23 DIAGNOSIS — K70.9 ALCOHOLIC LIVER DISEASE (HCC): Primary | ICD-10-CM

## 2024-02-23 DIAGNOSIS — E88.810 METABOLIC SYNDROME: ICD-10-CM

## 2024-02-23 PROCEDURE — 99214 OFFICE O/P EST MOD 30 MIN: CPT | Performed by: STUDENT IN AN ORGANIZED HEALTH CARE EDUCATION/TRAINING PROGRAM

## 2024-02-23 RX ORDER — B-COMPLEX WITH VITAMIN C
TABLET ORAL DAILY
COMMUNITY

## 2024-02-23 NOTE — PATIENT INSTRUCTIONS
The main complications of cirrhosis are:   1. Ascites (fluid in the belly)  2. Hepatic Encephalopathy (confusion)  3. Varices (veins in the esophagus that can bleed)  4. Hepatocellular Carcinoma (liver cancer)    Should you develop any new symptoms or have worsening symptoms please contact our office immediately.      You should go to the nearest emergency room and call our office immediately if any of the following occur:  1. Temperature of 101 or above  2. Confusion  3. Vomiting blood  4. Multiple black or red stools  5. Shortness of breath  6. Severe diarrhea  7. Vomiting for more than twice in 24 hours     General Instructions:  - You will need a picture of your liver with an ultrasound or CTMRI every 6 months to screen for liver cancer  - Take no more than 2 grams of tylenol in 24 hours  - Do not use any products containing NSAIDs, benzodiazapines, and narcotics.  - Avoid all avoid raw shellfish   - Try and participate in daily exercise as to prevent loss of muscle mass  - Abstain from all alcohol intake  - You should ask your primary care to ensure you have been vaccinated for pneumonia and the flu  - Call my office to discuss the risks and benefits of any new medications (prescribed or over the counter) before taking the new medication.  - Call my office to discuss any surgeries (elective or emergent) since cirrhotic patients are at an increased risk for surgery in terms of infection, bleeding, and even death.  - Call my office if you are ever seen in the ER or admitted to the hospital.

## 2024-02-23 NOTE — PROGRESS NOTES
[facility] - Outpatient Consultation  Pranay Pearson 53 y.o. male MRN: [id number]  Encounter: [id number]    PCP:  GAUTAM Mares, [phone]  Referring Provider:  Mitzy Whitmore CRNP, [phone]    Patient: Pranay Pearson, 1970  Reason for Referral: alcoholic fatty liver disease     ASSESSMENT/PLAN:  53 y.o. male with history of HTN, HLD, class III obesity (BMI 45) and EtOH dependence who presents for initial evaluation. He has no acute liver specific complaints or clinical evidence of hepatic decompensation.     He was previously followed by Dr. Martínez for abnormal liver tests dating back to 2016. He was noted to have AST predominance and chronic thrombocytopenia prompting an US which showed hepatomegaly, and steatosis. He had an US elastography in September 2023 which showed LSM 9.28 but IQR was 63% and so study was inconclusive. It was recommended that he pursue a liver biopsy but he did not pursue given concerns for potential adverse events.    Suspect that he has alcoholic liver disease with concern for advanced fibrosis given his chronic thrombocytopenia, although this can also be seen with EtOH abuse. We discussed the natural history, prognosis, and complications of EtOH liver disease, including progression to cirrhosis as well as risk for portal hypertensive complications. We discussed that complete EtOH abstinence is key in preventing progression of disease as well as aggressive modification of his metabolic risk factors.     Would recommend completing his serologic work-up to exclude other causes of CLD. He needs to have an accurate assessment of liver fibrosis and discussed different strategies including repeating US elastography, MR elastography as well as liver biopsy which remains the gold standard. Discussed that active EtOH use also overestimates hepatic fibrosis as well as skews interpretation of his lab results and so ideally, reassessment should be done  after a period of abstinence. We also discussed that US elastography may not be reliable given his body habitus. The patient was understanding his options and is leaning towards liver biopsy given concerns for claustrophobia.     Otherwise, he will return to clinic in 3 months with pre-clinic labs (ordered). Thank you for the opportunity to consult in his care.    - CBC, CMP and INR  - HAV IgG, HBsAg, HBcAb and HBsAb; vaccinate if non-immune  - A1AT levels and phentoype  - Iron studies with ferritin  - T/c liver biopsy vs. Repeating US elastography     Meghan Gary MD  Division of Gastroenterology and Hepatology  [facility]    ============================================================================  CC/HPI: 53 y.o. male with history of HTN, HLD, and class III obesity (BMI 45) who presents for initial evaluation.    He was previously followed by Dr. Martínez for abnormal liver tests dating back to 2016. He was noted to have AST predominance and chronic thrombocytopenia prompting an US which showed hepatomegaly, and steatosis. He had an US elastography in September 2023 which showed LSM 9.28 kPA but IQR was 63% and so study was inconclusive. It was recommended that he pursue a liver biopsy but he did not pursue given concerns for potential adverse events.    Regarding his risk factors for liver disease, he reports drinking 10-12 beers daily for at least 20 years. He denies history of complicated withdrawal and has not had any legal or work-related issues due to EtOH. He has been abstinent from 3 months at a time for surgeries and infections. He has a family history of alcoholism.     He denies a family history of cirrhosis and liver cancer.     ROS: Complete review of systems otherwise negative.     PAST MEDICAL/SURGICAL HISTORY:  Past Medical History:   Diagnosis Date    Colon cancer screening     Family history of colonic polyps     Fatty liver     Hemorrhoids     last assessed-9/12/2014     Hernia 2012    Hyperlipidemia     last assessed-11/7/2017    Hypertension     Morbid obesity with BMI of 40.0-44.9, adult (HCC)         Past Surgical History:   Procedure Laterality Date    COLONOSCOPY  4/2023    FOOT SURGERY Left 2018    fusion-cleaned out arthritis used cadaver bones-plate/screws    HAND FRACTURE REPAIR Left 1990    HERNIA REPAIR  2012    ME REPAIR FIRST ABDOMINAL WALL HERNIA N/A 10/09/2017    Procedure: REPAIR HERNIA VENTRAL WITH MESH;  Surgeon: Yoshi Andrews MD;  Location: [facility];  Service: General    VASECTOMY      surgery vas deferens vasectomy       FAMILY/SOCIAL HISTORY:  Family History   Problem Relation Age of Onset    Heart disease Mother         cardiac disorder    Heart failure Mother         CHF    Melanoma Mother     Colon cancer Mother     Heart disease Father         cardiac disorder    Colon cancer Brother     Heart disease Brother         heart artery stent    Substance Abuse Neg Hx     Mental illness Neg Hx        Social History     Tobacco Use    Smoking status: Never    Smokeless tobacco: Former     Types: Snuff, Chew     Quit date: 12/12/1998   Vaping Use    Vaping status: Never Used   Substance Use Topics    Alcohol use: Yes     Comment: socially 4x week...     Drug use: No       MEDICATIONS:  Current Outpatient Medications on File Prior to Visit   Medication Sig Dispense Refill    Ascorbic Acid (vitamin C) 1000 MG tablet Take 1,000 mg by mouth every morning      B Complex Vitamins (Vitamin B Complex) TABS Take by mouth in the morning      choline fenofibrate (TRILIPIX) 135 MG capsule 135 mg every morning      milk thistle 175 MG tablet Take 175 mg by mouth daily      Omega-3 Fatty Acids (FISH OIL) 1200 MG CAPS Take 2 capsules by mouth every morning      rosuvastatin (CRESTOR) 20 MG tablet 20 mg every morning      torsemide (DEMADEX) 20 mg tablet Take 20 mg by mouth every morning      TURMERIC PO Take by mouth in the morning      valsartan-hydrochlorothiazide  "(DIOVAN-HCT) 320-12.5 MG per tablet 1 tablet every morning      cyanocobalamin (VITAMIN B-12) 2000 MCG tablet Take 2,000 mcg by mouth every morning Taking 2500 (Patient not taking: Reported on 2/23/2024)      Multiple Vitamins-Minerals (CENTRUM ADULTS PO) Take by mouth every morning (Patient not taking: Reported on 2/23/2024)      Multiple Vitamins-Minerals (ZINC PO) Take by mouth every morning (Patient not taking: Reported on 2/23/2024)       No current facility-administered medications on file prior to visit.     No Known Allergies    PHYSICAL EXAM:  /88 (BP Location: Left arm, Patient Position: Sitting, Cuff Size: Adult)   Pulse 78   Temp 97.8 °F (36.6 °C) (Tympanic)   Ht 6' 3\" (1.905 m)   Wt (!) 163 kg (359 lb 3.2 oz)   BMI 44.90 kg/m²   GENERAL: NAD, AAO  HEENT: anicteric, OP clear, MMM  ABDOMEN: S/ND/NT, normoactive BS, no hepatomegaly, spleen not palpable  EXTREMITIES: no edema  SKIN: no rashes, no palmar erythema, no spider angiomata   NEURO: normal gait, no tremor, no asterixis     LABS/RADIOLOGY/ENDOSCOPY:  Lab Results   Component Value Date    WBC 5.9 01/09/2024    HGB 13.7 01/09/2024    HCT 39.5 01/09/2024     (L) 01/09/2024    GLUF 114 (H) 10/03/2017    BUN 10 01/09/2024    CREATININE 0.84 01/09/2024     11/07/2017    K 4.1 01/09/2024     01/09/2024    CO2 20 01/09/2024    PROT 7.4 11/07/2017    ALKPHOS 59 11/07/2017    ALT 54 (H) 01/09/2024    AST 74 (H) 01/09/2024    GLOB 3.1 01/09/2024    CALCIUM 9.3 11/07/2017    EGFR 106 01/09/2024    CHOL 180 11/07/2017    TRIG 292 (H) 01/09/2024    HDL 29 (L) 01/09/2024     HCV Ab-    US elastography (9/27/2023)  Shear Wave Elastography sampling was performed to evaluate stiffness changes within the liver associated with fibrosis.  E1  8.96 kPa  E2  8.96 kPa  E3  7.3 kPa  E4  9.6 kPa  E5  7.8 kPa  E6  22.86 kPa  E7  14.31 kPa  E8  15.16 kPa  E9  12.79 kPa  E10 6.81 kPa     E median:  9.28 kPa.  The attempted Metavir scoring is " unfortunately nondiagnostic due to inadequate acoustic windows.  IQR/median:  63 %.  (IQR of less than 30% is considered a satisfactory data set).    Computed MELD 3.0 unavailable. One or more values for this score either were not found within the given timeframe or did not fit some other criterion.  Computed MELD-Na unavailable. One or more values for this score either were not found within the given timeframe or did not fit some other criterion.

## 2024-04-10 LAB
CHOLEST SERPL-MCNC: 137 MG/DL (ref 100–199)
CHOLEST/HDLC SERPL: 5.1 RATIO (ref 0–5)
EST. AVERAGE GLUCOSE BLD GHB EST-MCNC: 128 MG/DL
HBA1C MFR BLD: 6.1 % (ref 4.8–5.6)
HDLC SERPL-MCNC: 27 MG/DL
LDLC SERPL CALC-MCNC: 59 MG/DL (ref 0–99)
LDLC SERPL DIRECT ASSAY-MCNC: 53 MG/DL (ref 0–99)
SL AMB VLDL CHOLESTEROL CALC: 51 MG/DL (ref 5–40)
TRIGL SERPL-MCNC: 325 MG/DL (ref 0–149)

## 2024-04-22 ENCOUNTER — OFFICE VISIT (OUTPATIENT)
Dept: FAMILY MEDICINE CLINIC | Facility: CLINIC | Age: 54
End: 2024-04-22
Payer: COMMERCIAL

## 2024-04-22 VITALS
DIASTOLIC BLOOD PRESSURE: 78 MMHG | TEMPERATURE: 97.2 F | OXYGEN SATURATION: 97 % | SYSTOLIC BLOOD PRESSURE: 140 MMHG | WEIGHT: 315 LBS | HEIGHT: 75 IN | BODY MASS INDEX: 39.17 KG/M2 | HEART RATE: 82 BPM | RESPIRATION RATE: 14 BRPM

## 2024-04-22 DIAGNOSIS — E66.01 CLASS 3 SEVERE OBESITY DUE TO EXCESS CALORIES WITH SERIOUS COMORBIDITY AND BODY MASS INDEX (BMI) OF 45.0 TO 49.9 IN ADULT (HCC): ICD-10-CM

## 2024-04-22 DIAGNOSIS — Z23 ENCOUNTER FOR IMMUNIZATION: ICD-10-CM

## 2024-04-22 DIAGNOSIS — I10 ESSENTIAL HYPERTENSION: ICD-10-CM

## 2024-04-22 DIAGNOSIS — R73.03 PREDIABETES: Primary | ICD-10-CM

## 2024-04-22 DIAGNOSIS — E78.2 MIXED HYPERLIPIDEMIA: ICD-10-CM

## 2024-04-22 PROCEDURE — 90471 IMMUNIZATION ADMIN: CPT

## 2024-04-22 PROCEDURE — 99214 OFFICE O/P EST MOD 30 MIN: CPT | Performed by: NURSE PRACTITIONER

## 2024-04-22 PROCEDURE — 90750 HZV VACC RECOMBINANT IM: CPT

## 2024-04-22 NOTE — PROGRESS NOTES
Assessment/Plan:    1. Essential hypertension  Assessment & Plan:  BP wnl in office today.  Continue medications as directed.  Cardiology following.       2. Mixed hyperlipidemia  Assessment & Plan:  Pt is following up with Dr. Ascencio, cardiology.  Discussed results in office today with patient.  Encourage regular exercise, nutrition and weight loss.  Continue medications as directed.  Will fax results to cardiology for further review.      3. Class 3 severe obesity due to excess calories with serious comorbidity and body mass index (BMI) of 45.0 to 49.9 in adult (HCC)  Assessment & Plan:  Advise regular exercise and nutrition.   Encourage weight loss.       4. Encounter for immunization  -     Zoster Vaccine Recombinant IM            Return in about 3 months (around 7/22/2024) for Pre-diabetes, recheck - a1c in office .    Subjective:      Patient ID: Pranay Pearson is a 53 y.o. male.    Chief Complaint   Patient presents with    Follow-up     Pt here for 3 month recheck lipids and A1C       Juvencio is a 53 year old male with prediabetes, hypertension, hyperlipidemia, obesity, neuropathy and OSMAN, who presents to the office for review labs and shingles vaccine. Denies any acute complaints at this time.         The following portions of the patient's history were reviewed and updated as appropriate: allergies, current medications, past family history, past medical history, past social history, past surgical history and problem list.    Review of Systems   Constitutional:  Negative for chills, fatigue and fever.   Respiratory:  Negative for cough, chest tightness and shortness of breath.    Cardiovascular:  Negative for chest pain.         Current Outpatient Medications   Medication Sig Dispense Refill    Ascorbic Acid (vitamin C) 1000 MG tablet Take 1,000 mg by mouth every morning      B Complex Vitamins (Vitamin B Complex) TABS Take by mouth in the morning      choline fenofibrate (TRILIPIX) 135 MG capsule 135 mg  "every morning      milk thistle 175 MG tablet Take 175 mg by mouth daily      Multiple Vitamins-Minerals (ZINC PO) Take by mouth every morning      Omega-3 Fatty Acids (FISH OIL) 1200 MG CAPS Take 2 capsules by mouth every morning      rosuvastatin (CRESTOR) 20 MG tablet 20 mg every morning      torsemide (DEMADEX) 20 mg tablet Take 20 mg by mouth every morning      TURMERIC PO Take by mouth in the morning      valsartan-hydrochlorothiazide (DIOVAN-HCT) 320-12.5 MG per tablet 1 tablet every morning       No current facility-administered medications for this visit.       Objective:    /78 (BP Location: Left arm, Patient Position: Sitting, Cuff Size: Large)   Pulse 82   Temp (!) 97.2 °F (36.2 °C) (Temporal)   Resp 14   Ht 6' 3\" (1.905 m)   Wt (!) 163 kg (360 lb)   SpO2 97%   BMI 45.00 kg/m²        Physical Exam  Vitals reviewed.   Constitutional:       Appearance: Normal appearance.   HENT:      Head: Normocephalic and atraumatic.   Cardiovascular:      Rate and Rhythm: Normal rate and regular rhythm.      Heart sounds: Normal heart sounds.   Pulmonary:      Breath sounds: Normal breath sounds.   Neurological:      Mental Status: He is alert and oriented to person, place, and time.   Psychiatric:         Mood and Affect: Mood normal.                GAUTAM Mares  "

## 2024-07-22 ENCOUNTER — OFFICE VISIT (OUTPATIENT)
Dept: FAMILY MEDICINE CLINIC | Facility: CLINIC | Age: 54
End: 2024-07-22
Payer: COMMERCIAL

## 2024-07-22 VITALS
OXYGEN SATURATION: 96 % | HEIGHT: 75 IN | RESPIRATION RATE: 16 BRPM | HEART RATE: 84 BPM | SYSTOLIC BLOOD PRESSURE: 142 MMHG | DIASTOLIC BLOOD PRESSURE: 78 MMHG | TEMPERATURE: 97.7 F | WEIGHT: 315 LBS | BODY MASS INDEX: 39.17 KG/M2

## 2024-07-22 DIAGNOSIS — R73.03 PREDIABETES: Primary | ICD-10-CM

## 2024-07-22 DIAGNOSIS — I10 ESSENTIAL HYPERTENSION: ICD-10-CM

## 2024-07-22 DIAGNOSIS — E66.01 CLASS 3 SEVERE OBESITY DUE TO EXCESS CALORIES WITH SERIOUS COMORBIDITY AND BODY MASS INDEX (BMI) OF 45.0 TO 49.9 IN ADULT (HCC): ICD-10-CM

## 2024-07-22 LAB — SL AMB POCT HEMOGLOBIN AIC: 6 (ref ?–6.5)

## 2024-07-22 PROCEDURE — 83036 HEMOGLOBIN GLYCOSYLATED A1C: CPT | Performed by: NURSE PRACTITIONER

## 2024-07-22 PROCEDURE — 99213 OFFICE O/P EST LOW 20 MIN: CPT | Performed by: NURSE PRACTITIONER

## 2024-07-22 NOTE — PROGRESS NOTES
Assessment/Plan:    1. Prediabetes  Assessment & Plan:  A1c 6.0   Encourage low carb and low sugar diet.  Advise regular exercise as tolerated.   Orders:  -     POCT hemoglobin A1c  2. Essential hypertension  Assessment & Plan:  Continue medications as directed.  Stable, no changes today.   3. Class 3 severe obesity due to excess calories with serious comorbidity and body mass index (BMI) of 45.0 to 49.9 in adult (HCC)  Assessment & Plan:  Encourage nutrition and regular exercise as tolerated.          Return in about 3 months (around 10/22/2024) for Pre-diabetes, recheck - A1c.    Subjective:      Patient ID: Pranay Pearson is a 54 y.o. male.    Chief Complaint   Patient presents with    Follow-up     Pre-diabetes A1C check       Juvencio is a 54 year old male with prediabetes, hypertension and obesity who presents to the office for a 3 month recheck. Denies fever, chills, chest pain, shortness of breath.         The following portions of the patient's history were reviewed and updated as appropriate: allergies, current medications, past family history, past medical history, past social history, past surgical history and problem list.    Review of Systems   Constitutional:  Negative for chills and fever.   Respiratory:  Negative for shortness of breath.    Cardiovascular:  Negative for chest pain.         Current Outpatient Medications   Medication Sig Dispense Refill    Ascorbic Acid (vitamin C) 1000 MG tablet Take 1,000 mg by mouth every morning      B Complex Vitamins (Vitamin B Complex) TABS Take by mouth in the morning      choline fenofibrate (TRILIPIX) 135 MG capsule 135 mg every morning      milk thistle 175 MG tablet Take 175 mg by mouth daily      Multiple Vitamins-Minerals (ZINC PO) Take by mouth every morning      Omega-3 Fatty Acids (FISH OIL) 1200 MG CAPS Take 2 capsules by mouth every morning      rosuvastatin (CRESTOR) 20 MG tablet 20 mg every morning      torsemide (DEMADEX) 20 mg tablet Take 20 mg by  "mouth every morning      TURMERIC PO Take by mouth in the morning      valsartan-hydrochlorothiazide (DIOVAN-HCT) 320-12.5 MG per tablet 1 tablet every morning       No current facility-administered medications for this visit.       Objective:    /78 (BP Location: Left arm, Patient Position: Sitting, Cuff Size: Standard)   Pulse 84   Temp 97.7 °F (36.5 °C) (Temporal)   Resp 16   Ht 6' 3\" (1.905 m)   Wt (!) 164 kg (362 lb)   SpO2 96%   BMI 45.25 kg/m²        Physical Exam  Vitals reviewed.   Constitutional:       Appearance: Normal appearance. He is obese.   HENT:      Head: Normocephalic and atraumatic.   Cardiovascular:      Rate and Rhythm: Normal rate and regular rhythm.      Heart sounds: Normal heart sounds.   Pulmonary:      Effort: Pulmonary effort is normal.      Breath sounds: Normal breath sounds.   Neurological:      Mental Status: He is alert and oriented to person, place, and time.   Psychiatric:         Mood and Affect: Mood normal.                GAUTAM Mares  "

## 2024-10-21 ENCOUNTER — OFFICE VISIT (OUTPATIENT)
Dept: FAMILY MEDICINE CLINIC | Facility: CLINIC | Age: 54
End: 2024-10-21
Payer: COMMERCIAL

## 2024-10-21 VITALS
DIASTOLIC BLOOD PRESSURE: 82 MMHG | TEMPERATURE: 97.2 F | HEIGHT: 75 IN | RESPIRATION RATE: 22 BRPM | HEART RATE: 84 BPM | BODY MASS INDEX: 39.17 KG/M2 | SYSTOLIC BLOOD PRESSURE: 170 MMHG | OXYGEN SATURATION: 95 % | WEIGHT: 315 LBS

## 2024-10-21 DIAGNOSIS — R73.03 PREDIABETES: Primary | ICD-10-CM

## 2024-10-21 DIAGNOSIS — E78.2 MIXED HYPERLIPIDEMIA: ICD-10-CM

## 2024-10-21 DIAGNOSIS — I10 ESSENTIAL HYPERTENSION: ICD-10-CM

## 2024-10-21 DIAGNOSIS — I10 ELEVATED BLOOD PRESSURE READING WITH DIAGNOSIS OF HYPERTENSION: ICD-10-CM

## 2024-10-21 LAB — SL AMB POCT HEMOGLOBIN AIC: 6.2 (ref ?–6.5)

## 2024-10-21 PROCEDURE — 99214 OFFICE O/P EST MOD 30 MIN: CPT | Performed by: NURSE PRACTITIONER

## 2024-10-21 PROCEDURE — 93000 ELECTROCARDIOGRAM COMPLETE: CPT | Performed by: NURSE PRACTITIONER

## 2024-10-21 PROCEDURE — 83036 HEMOGLOBIN GLYCOSYLATED A1C: CPT | Performed by: NURSE PRACTITIONER

## 2024-10-21 RX ORDER — CEFADROXIL 500 MG/1
CAPSULE ORAL
COMMUNITY
Start: 2024-10-18

## 2024-10-21 NOTE — ASSESSMENT & PLAN NOTE
BP elevated in office today.  Advised pt of need for additional intervention.  Cardiac risks reviewed.   Pt would like to wait to do a BP recheck in one week.  Consider addition of additional agent, such as amlodipine, if no improvement.

## 2024-10-21 NOTE — PROGRESS NOTES
Assessment/Plan:    1. Prediabetes  Assessment & Plan:  A1c 6.2  Discussed therapy with metformin, pt refusing at this time.  Encourage regular exercise and nutrition.  Encourage weight loss.   Orders:  -     POCT hemoglobin A1c  -     Hemoglobin A1C; Future; Expected date: 01/21/2025  -     Hemoglobin A1C  -     POCT ECG  2. Essential hypertension  Assessment & Plan:  BP elevated in office today.  Advised pt of need for additional intervention.  Cardiac risks reviewed.   Pt would like to wait to do a BP recheck in one week.  Consider addition of additional agent, such as amlodipine, if no improvement.   Orders:  -     POCT ECG  3. Mixed hyperlipidemia  -     Lipid panel; Future; Expected date: 01/21/2025  -     POCT ECG  4. Elevated blood pressure reading with diagnosis of hypertension  -     POCT ECG            Return in about 1 week (around 10/28/2024) for Blood pressure check.    Subjective:      Patient ID: Pranay Pearson is a 54 y.o. male.    Chief Complaint   Patient presents with    Follow-up     Pt here for A1C check       Pranay is a 54 year old male with hypertension, obesity, hyperlipidemia, who presents to the office for a recheck of A1c. Denies chest pain or shortness of breath. No acute complaints at this time.         The following portions of the patient's history were reviewed and updated as appropriate: allergies, current medications, past family history, past medical history, past social history, past surgical history and problem list.    Review of Systems   Constitutional:  Negative for chills, fatigue and fever.   Respiratory:  Negative for cough, chest tightness and shortness of breath.    Cardiovascular:  Negative for chest pain.         Current Outpatient Medications   Medication Sig Dispense Refill    Ascorbic Acid (vitamin C) 1000 MG tablet Take 1,000 mg by mouth every morning      B Complex Vitamins (Vitamin B Complex) TABS Take by mouth in the morning      cefadroxil (DURICEF) 500 mg  "capsule       choline fenofibrate (TRILIPIX) 135 MG capsule 135 mg every morning      milk thistle 175 MG tablet Take 175 mg by mouth daily      Multiple Vitamins-Minerals (ZINC PO) Take by mouth every morning      Omega-3 Fatty Acids (FISH OIL) 1200 MG CAPS Take 2 capsules by mouth every morning      rosuvastatin (CRESTOR) 20 MG tablet 20 mg every morning      torsemide (DEMADEX) 20 mg tablet Take 20 mg by mouth every morning      TURMERIC PO Take by mouth in the morning      valsartan-hydrochlorothiazide (DIOVAN-HCT) 320-12.5 MG per tablet 1 tablet every morning       No current facility-administered medications for this visit.       Objective:    /82 (BP Location: Left arm, Patient Position: Sitting)   Pulse 84   Temp (!) 97.2 °F (36.2 °C) (Temporal)   Resp 22   Ht 6' 3\" (1.905 m)   Wt (!) 161 kg (354 lb)   SpO2 95%   BMI 44.25 kg/m²        Physical Exam  Vitals reviewed.   Constitutional:       Appearance: Normal appearance.   HENT:      Head: Normocephalic and atraumatic.   Cardiovascular:      Rate and Rhythm: Normal rate and regular rhythm.      Heart sounds: Normal heart sounds.   Pulmonary:      Effort: Pulmonary effort is normal.      Breath sounds: Normal breath sounds.   Neurological:      Mental Status: He is alert and oriented to person, place, and time.   Psychiatric:         Mood and Affect: Mood normal.                GAUTAM Mares  "

## 2024-10-21 NOTE — ASSESSMENT & PLAN NOTE
A1c 6.2  Discussed therapy with metformin, pt refusing at this time.  Encourage regular exercise and nutrition.  Encourage weight loss.

## 2024-10-28 ENCOUNTER — OFFICE VISIT (OUTPATIENT)
Dept: FAMILY MEDICINE CLINIC | Facility: CLINIC | Age: 54
End: 2024-10-28
Payer: COMMERCIAL

## 2024-10-28 VITALS
HEIGHT: 75 IN | HEART RATE: 86 BPM | SYSTOLIC BLOOD PRESSURE: 132 MMHG | OXYGEN SATURATION: 95 % | WEIGHT: 315 LBS | BODY MASS INDEX: 39.17 KG/M2 | TEMPERATURE: 96 F | DIASTOLIC BLOOD PRESSURE: 80 MMHG | RESPIRATION RATE: 22 BRPM

## 2024-10-28 DIAGNOSIS — I10 ESSENTIAL HYPERTENSION: Primary | ICD-10-CM

## 2024-10-28 PROCEDURE — 99213 OFFICE O/P EST LOW 20 MIN: CPT | Performed by: NURSE PRACTITIONER

## 2024-10-28 NOTE — ASSESSMENT & PLAN NOTE
Pt reports that he was taking 325 mg ASA and thought this could be affecting his valsartan so he stopped.  BP wnl in office today.  Continue medications as directed.  Monitor BP periodically.

## 2024-10-28 NOTE — PROGRESS NOTES
Assessment/Plan:    1. Essential hypertension  Assessment & Plan:  Pt reports that he was taking 325 mg ASA and thought this could be affecting his valsartan so he stopped.  BP wnl in office today.  Continue medications as directed.  Monitor BP periodically.             Return if symptoms worsen or fail to improve.    Subjective:      Patient ID: Pranay Pearson is a 54 y.o. male.    Chief Complaint   Patient presents with    Follow-up     Pt here for one week f/u       Juvencio is a 54 year old male with hypertension who presents to the office for a one week recheck of elevated blood pressure.         The following portions of the patient's history were reviewed and updated as appropriate: allergies, current medications, past family history, past medical history, past social history, past surgical history and problem list.    Review of Systems   Constitutional:  Negative for chills, fatigue and fever.   Respiratory:  Negative for cough, chest tightness and shortness of breath.    Cardiovascular:  Negative for chest pain.         Current Outpatient Medications   Medication Sig Dispense Refill    amoxicillin-clavulanate (AUGMENTIN) 875-125 mg per tablet Take 1 tablet by mouth every 12 (twelve) hours      Ascorbic Acid (vitamin C) 1000 MG tablet Take 1,000 mg by mouth every morning      B Complex Vitamins (Vitamin B Complex) TABS Take by mouth in the morning      choline fenofibrate (TRILIPIX) 135 MG capsule 135 mg every morning      milk thistle 175 MG tablet Take 175 mg by mouth daily      Multiple Vitamins-Minerals (ZINC PO) Take by mouth every morning      Omega-3 Fatty Acids (FISH OIL) 1200 MG CAPS Take 2 capsules by mouth every morning      rosuvastatin (CRESTOR) 20 MG tablet 20 mg every morning      torsemide (DEMADEX) 20 mg tablet Take 20 mg by mouth every morning      TURMERIC PO Take by mouth in the morning      valsartan-hydrochlorothiazide (DIOVAN-HCT) 320-12.5 MG per tablet 1 tablet every morning       "cefadroxil (DURICEF) 500 mg capsule  (Patient not taking: Reported on 10/28/2024)       No current facility-administered medications for this visit.       Objective:    /80 (BP Location: Left arm, Patient Position: Sitting, Cuff Size: Large)   Pulse 86   Temp (!) 96 °F (35.6 °C) (Temporal)   Resp 22   Ht 6' 3\" (1.905 m)   Wt (!) 163 kg (359 lb)   SpO2 95%   BMI 44.87 kg/m²        Physical Exam  Vitals reviewed.   Constitutional:       Appearance: Normal appearance.   HENT:      Head: Normocephalic and atraumatic.   Cardiovascular:      Rate and Rhythm: Normal rate and regular rhythm.      Heart sounds: Normal heart sounds.   Pulmonary:      Breath sounds: Normal breath sounds.   Neurological:      Mental Status: He is alert and oriented to person, place, and time.   Psychiatric:         Mood and Affect: Mood normal.                GAUTAM Mares  "

## 2024-10-31 LAB
EST. AVERAGE GLUCOSE BLD GHB EST-MCNC: 140 MG/DL
HBA1C MFR BLD: 6.5 % (ref 4.8–5.6)

## 2024-11-29 ENCOUNTER — NURSE TRIAGE (OUTPATIENT)
Age: 54
End: 2024-11-29

## 2024-11-29 ENCOUNTER — APPOINTMENT (EMERGENCY)
Dept: RADIOLOGY | Facility: HOSPITAL | Age: 54
End: 2024-11-29
Payer: COMMERCIAL

## 2024-11-29 ENCOUNTER — HOSPITAL ENCOUNTER (EMERGENCY)
Facility: HOSPITAL | Age: 54
Discharge: HOME | End: 2024-11-29
Attending: EMERGENCY MEDICINE
Payer: COMMERCIAL

## 2024-11-29 VITALS
SYSTOLIC BLOOD PRESSURE: 139 MMHG | BODY MASS INDEX: 39.17 KG/M2 | HEART RATE: 96 BPM | RESPIRATION RATE: 18 BRPM | OXYGEN SATURATION: 95 % | HEIGHT: 75 IN | DIASTOLIC BLOOD PRESSURE: 78 MMHG | WEIGHT: 315 LBS | TEMPERATURE: 99.2 F

## 2024-11-29 DIAGNOSIS — L03.90 CELLULITIS: Primary | ICD-10-CM

## 2024-11-29 LAB
ANION GAP SERPL CALCULATED.3IONS-SCNC: 8 MMOL/L (ref 4–13)
BASOPHILS # BLD AUTO: 0.04 THOUSANDS/ΜL (ref 0–0.1)
BASOPHILS NFR BLD AUTO: 1 % (ref 0–1)
BUN SERPL-MCNC: 10 MG/DL (ref 5–25)
CALCIUM SERPL-MCNC: 9 MG/DL (ref 8.4–10.2)
CHLORIDE SERPL-SCNC: 100 MMOL/L (ref 96–108)
CO2 SERPL-SCNC: 26 MMOL/L (ref 21–32)
CREAT SERPL-MCNC: 0.67 MG/DL (ref 0.6–1.3)
EOSINOPHIL # BLD AUTO: 0.27 THOUSAND/ΜL (ref 0–0.61)
EOSINOPHIL NFR BLD AUTO: 4 % (ref 0–6)
ERYTHROCYTE [DISTWIDTH] IN BLOOD BY AUTOMATED COUNT: 12.5 % (ref 11.6–15.1)
GFR SERPL CREATININE-BSD FRML MDRD: 110 ML/MIN/1.73SQ M
GLUCOSE SERPL-MCNC: 146 MG/DL (ref 65–140)
HCT VFR BLD AUTO: 39.2 % (ref 36.5–49.3)
HGB BLD-MCNC: 13.6 G/DL (ref 12–17)
IMM GRANULOCYTES # BLD AUTO: 0.02 THOUSAND/UL (ref 0–0.2)
IMM GRANULOCYTES NFR BLD AUTO: 0 % (ref 0–2)
LYMPHOCYTES # BLD AUTO: 1.65 THOUSANDS/ΜL (ref 0.6–4.47)
LYMPHOCYTES NFR BLD AUTO: 26 % (ref 14–44)
MCH RBC QN AUTO: 30.9 PG (ref 26.8–34.3)
MCHC RBC AUTO-ENTMCNC: 34.7 G/DL (ref 31.4–37.4)
MCV RBC AUTO: 89 FL (ref 82–98)
MONOCYTES # BLD AUTO: 0.46 THOUSAND/ΜL (ref 0.17–1.22)
MONOCYTES NFR BLD AUTO: 7 % (ref 4–12)
NEUTROPHILS # BLD AUTO: 3.83 THOUSANDS/ΜL (ref 1.85–7.62)
NEUTS SEG NFR BLD AUTO: 62 % (ref 43–75)
NRBC BLD AUTO-RTO: 0 /100 WBCS
PLATELET # BLD AUTO: 139 THOUSANDS/UL (ref 149–390)
PMV BLD AUTO: 9.9 FL (ref 8.9–12.7)
POTASSIUM SERPL-SCNC: 4.5 MMOL/L (ref 3.5–5.3)
RBC # BLD AUTO: 4.4 MILLION/UL (ref 3.88–5.62)
SODIUM SERPL-SCNC: 134 MMOL/L (ref 135–147)
WBC # BLD AUTO: 6.27 THOUSAND/UL (ref 4.31–10.16)

## 2024-11-29 PROCEDURE — 96365 THER/PROPH/DIAG IV INF INIT: CPT

## 2024-11-29 PROCEDURE — 99284 EMERGENCY DEPT VISIT MOD MDM: CPT | Performed by: EMERGENCY MEDICINE

## 2024-11-29 PROCEDURE — 36415 COLL VENOUS BLD VENIPUNCTURE: CPT | Performed by: EMERGENCY MEDICINE

## 2024-11-29 PROCEDURE — 93971 EXTREMITY STUDY: CPT

## 2024-11-29 PROCEDURE — 96366 THER/PROPH/DIAG IV INF ADDON: CPT

## 2024-11-29 PROCEDURE — 80048 BASIC METABOLIC PNL TOTAL CA: CPT | Performed by: EMERGENCY MEDICINE

## 2024-11-29 PROCEDURE — 99284 EMERGENCY DEPT VISIT MOD MDM: CPT

## 2024-11-29 PROCEDURE — 85025 COMPLETE CBC W/AUTO DIFF WBC: CPT | Performed by: EMERGENCY MEDICINE

## 2024-11-29 RX ORDER — CEPHALEXIN 500 MG/1
500 CAPSULE ORAL EVERY 6 HOURS SCHEDULED
Qty: 40 CAPSULE | Refills: 0 | Status: SHIPPED | OUTPATIENT
Start: 2024-11-29 | End: 2024-12-09

## 2024-11-29 RX ADMIN — VANCOMYCIN HYDROCHLORIDE 1750 MG: 1 INJECTION, POWDER, LYOPHILIZED, FOR SOLUTION INTRAVENOUS at 11:00

## 2024-11-29 NOTE — ED PROVIDER NOTES
Time reflects when diagnosis was documented in both MDM as applicable and the Disposition within this note       Time User Action Codes Description Comment    11/29/2024 11:38 AM Artemio Sterling Add [L03.90] Cellulitis           ED Disposition       ED Disposition   Discharge    Condition   Stable    Date/Time   Fri Nov 29, 2024 11:38 AM    Comment   Pranay Pearson discharge to home/self care.                   Assessment & Plan       Medical Decision Making  Patient has moderate chronic edema both lower extremities.  He has had cellulitis in the past.  Patient has a warm tender erythematous area in the pretibial area consistent with recurrent cellulitis.  Index of suspicion for DVT is low    Amount and/or Complexity of Data Reviewed  Labs: ordered.    Risk  Prescription drug management.             Medications   vancomycin (VANCOCIN) 1,750 mg in sodium chloride 0.9 % 500 mL IVPB (0 mg Intravenous Stopped 11/29/24 1322)       ED Risk Strat Scores                           SBIRT 20yo+      Flowsheet Row Most Recent Value   Initial Alcohol Screen: US AUDIT-C     1. How often do you have a drink containing alcohol? 0 Filed at: 11/29/2024 0954   2. How many drinks containing alcohol do you have on a typical day you are drinking?  0 Filed at: 11/29/2024 0954   3a. Male UNDER 65: How often do you have five or more drinks on one occasion? 0 Filed at: 11/29/2024 0954   3b. FEMALE Any Age, or MALE 65+: How often do you have 4 or more drinks on one occassion? 0 Filed at: 11/29/2024 0954   Audit-C Score 0 Filed at: 11/29/2024 0954   LAUREN: How many times in the past year have you...    Used an illegal drug or used a prescription medication for non-medical reasons? Never Filed at: 11/29/2024 0954                            History of Present Illness       Chief Complaint   Patient presents with    Leg Pain     Left shin redness and swelling since Monday. Sent for r/o blood clot       Past Medical History:   Diagnosis Date     Colon cancer screening     Family history of colonic polyps     Fatty liver     Hemorrhoids     last assessed-9/12/2014    Hernia 2012    Hyperlipidemia     last assessed-11/7/2017    Hypertension     Morbid obesity with BMI of 40.0-44.9, adult (HCC)       Past Surgical History:   Procedure Laterality Date    COLONOSCOPY  4/2023    FOOT SURGERY Left 2018    fusion-cleaned out arthritis used cadaver bones-plate/screws    HAND FRACTURE REPAIR Left 1990    HERNIA REPAIR  2012    DE REPAIR FIRST ABDOMINAL WALL HERNIA N/A 10/09/2017    Procedure: REPAIR HERNIA VENTRAL WITH MESH;  Surgeon: Yoshi Andrews MD;  Location: [facility];  Service: General    VASECTOMY      surgery vas deferens vasectomy      Family History   Problem Relation Age of Onset    Heart disease Mother         cardiac disorder    Heart failure Mother         CHF    Melanoma Mother     Colon cancer Mother     Heart disease Father         cardiac disorder    Colon cancer Brother     Heart disease Brother         heart artery stent    Substance Abuse Neg Hx     Mental illness Neg Hx       Social History     Tobacco Use    Smoking status: Never     Passive exposure: Never    Smokeless tobacco: Former     Types: Snuff, Chew     Quit date: 12/12/1998   Vaping Use    Vaping status: Never Used   Substance Use Topics    Alcohol use: Yes     Comment: socially 4x week...     Drug use: No      E-Cigarette/Vaping    E-Cigarette Use Never User       E-Cigarette/Vaping Substances    Nicotine No     THC No     CBD No     Flavoring No     Other No     Unknown No       I have reviewed and agree with the history as documented.     Patient is a large framed individual with chronic moderate bilateral lower extremity edema.  He has a prior history of cellulitis.  Patient states she has had increasing pain redness and swelling of the right lower remedy since Monday.  No fever.  Patient denies any injury to the area.  Patient was in contact with his primary care office who  were concerned about the possibility of a DVT and sent the patient in for evaluation of such.  He arrives awake and alert with normal SaO2 and no chest pain or shortness of breath.  He has no prior history of MRSA        Review of Systems   Constitutional:  Negative for chills and fever.   HENT:  Negative for congestion and sore throat.    Eyes:  Negative for visual disturbance.   Respiratory:  Negative for shortness of breath.    Cardiovascular:  Positive for leg swelling. Negative for chest pain.   Gastrointestinal:  Negative for abdominal pain.   Genitourinary:  Negative for dysuria.   Musculoskeletal:  Positive for arthralgias. Negative for back pain.   Skin:  Positive for color change and rash.   Neurological:  Negative for weakness and headaches.   Hematological:  Does not bruise/bleed easily.   Psychiatric/Behavioral:  Negative for confusion.    All other systems reviewed and are negative.          Objective       ED Triage Vitals   Temperature Pulse Blood Pressure Respirations SpO2 Patient Position - Orthostatic VS   11/29/24 0949 11/29/24 0949 11/29/24 0950 11/29/24 0949 11/29/24 0949 --   99.2 °F (37.3 °C) 96 139/78 18 95 %       Temp src Heart Rate Source BP Location FiO2 (%) Pain Score    -- -- -- -- 11/29/24 0948        6      Vitals      Date and Time Temp Pulse SpO2 Resp BP Pain Score FACES Pain Rating User   11/29/24 0950 -- -- -- -- 139/78 -- -- ISIDRO   11/29/24 0949 99.2 °F (37.3 °C) 96 95 % 18 -- -- -- ISIDRO   11/29/24 0948 -- -- -- -- -- 6 -- ISIDRO            Physical Exam  Vitals and nursing note reviewed.   Constitutional:       Appearance: He is obese.   HENT:      Head: Normocephalic.      Right Ear: External ear normal.      Left Ear: External ear normal.      Nose: Nose normal.      Mouth/Throat:      Mouth: Mucous membranes are moist.   Eyes:      Conjunctiva/sclera: Conjunctivae normal.   Cardiovascular:      Rate and Rhythm: Normal rate.      Pulses: Normal pulses.   Pulmonary:      Effort:  Pulmonary effort is normal.      Breath sounds: Normal breath sounds.   Abdominal:      Palpations: Abdomen is soft.      Tenderness: There is no abdominal tenderness.   Musculoskeletal:         General: Swelling and tenderness present. Normal range of motion.      Cervical back: Normal range of motion.   Skin:     General: Skin is warm and dry.      Capillary Refill: Capillary refill takes less than 2 seconds.      Findings: Erythema present.   Neurological:      General: No focal deficit present.      Mental Status: He is alert.   Psychiatric:         Mood and Affect: Mood normal.         Behavior: Behavior normal.         Results Reviewed       Procedure Component Value Units Date/Time    Basic metabolic panel [[id number]]  (Abnormal) Collected: 11/29/24 1050    Lab Status: Final result Specimen: Blood from Arm, Left Updated: 11/29/24 1115     Sodium 134 mmol/L      Potassium 4.5 mmol/L      Chloride 100 mmol/L      CO2 26 mmol/L      ANION GAP 8 mmol/L      BUN 10 mg/dL      Creatinine 0.67 mg/dL      Glucose 146 mg/dL      Calcium 9.0 mg/dL      eGFR 110 ml/min/1.73sq m     Narrative:      National Kid[employer] Disease Foundation guidelines for Chronic Kidney Disease (CKD):     Stage 1 with normal or high GFR (GFR > 90 mL/min/1.73 square meters)    Stage 2 Mild CKD (GFR = 60-89 mL/min/1.73 square meters)    Stage 3A Moderate CKD (GFR = 45-59 mL/min/1.73 square meters)    Stage 3B Moderate CKD (GFR = 30-44 mL/min/1.73 square meters)    Stage 4 Severe CKD (GFR = 15-29 mL/min/1.73 square meters)    Stage 5 End Stage CKD (GFR <15 mL/min/1.73 square meters)  Note: GFR calculation is accurate only with a steady state creatinine    CBC and differential [[id number]]  (Abnormal) Collected: 11/29/24 1050    Lab Status: Final result Specimen: Blood from Arm, Left Updated: 11/29/24 1104     WBC 6.27 Thousand/uL      RBC 4.40 Million/uL      Hemoglobin 13.6 g/dL      Hematocrit 39.2 %      MCV 89 fL      MCH 30.9 pg      MCHC 34.7  g/dL      RDW 12.5 %      MPV 9.9 fL      Platelets 139 Thousands/uL      nRBC 0 /100 WBCs      Segmented % 62 %      Immature Grans % 0 %      Lymphocytes % 26 %      Monocytes % 7 %      Eosinophils Relative 4 %      Basophils Relative 1 %      Absolute Neutrophils 3.83 Thousands/µL      Absolute Immature Grans 0.02 Thousand/uL      Absolute Lymphocytes 1.65 Thousands/µL      Absolute Monocytes 0.46 Thousand/µL      Eosinophils Absolute 0.27 Thousand/µL      Basophils Absolute 0.04 Thousands/µL             VAS lower limb venous duplex study, unilateral/limited    (Results Pending)       Procedures    ED Medication and Procedure Management   Prior to Admission Medications   Prescriptions Last Dose Informant Patient Reported? Taking?   Ascorbic Acid (vitamin C) 1000 MG tablet  Self Yes No   Sig: Take 1,000 mg by mouth every morning   B Complex Vitamins (Vitamin B Complex) TABS  Self Yes No   Sig: Take by mouth in the morning   Multiple Vitamins-Minerals (ZINC PO)  Self Yes No   Sig: Take by mouth every morning   Omega-3 Fatty Acids (FISH OIL) 1200 MG CAPS  Self Yes No   Sig: Take 2 capsules by mouth every morning   TURMERIC PO  Self Yes No   Sig: Take by mouth in the morning   amoxicillin-clavulanate (AUGMENTIN) 875-125 mg per tablet  Self Yes No   Sig: Take 1 tablet by mouth every 12 (twelve) hours   cefadroxil (DURICEF) 500 mg capsule  Self Yes No   Patient not taking: Reported on 10/28/2024   choline fenofibrate (TRILIPIX) 135 MG capsule  Self Yes No   Si mg every morning   milk thistle 175 MG tablet  Self Yes No   Sig: Take 175 mg by mouth daily   rosuvastatin (CRESTOR) 20 MG tablet  Self Yes No   Si mg every morning   torsemide (DEMADEX) 20 mg tablet  Self Yes No   Sig: Take 20 mg by mouth every morning   valsartan-hydrochlorothiazide (DIOVAN-HCT) 320-12.5 MG per tablet  Self Yes No   Si tablet every morning      Facility-Administered Medications: None     Discharge Medication List as of  11/29/2024 11:39 AM        START taking these medications    Details   cephalexin (KEFLEX) 500 mg capsule Take 1 capsule (500 mg total) by mouth every 6 (six) hours for 10 days, Starting Fri 11/29/2024, Until Mon 12/9/2024, Normal           CONTINUE these medications which have NOT CHANGED    Details   amoxicillin-clavulanate (AUGMENTIN) 875-125 mg per tablet Take 1 tablet by mouth every 12 (twelve) hours, Starting Fri 10/25/2024, Historical Med      Ascorbic Acid (vitamin C) 1000 MG tablet Take 1,000 mg by mouth every morning, Historical Med      B Complex Vitamins (Vitamin B Complex) TABS Take by mouth in the morning, Historical Med      cefadroxil (DURICEF) 500 mg capsule Historical Med      choline fenofibrate (TRILIPIX) 135 MG capsule 135 mg every morning, Starting Mon 1/21/2019, Historical Med      milk thistle 175 MG tablet Take 175 mg by mouth daily, Historical Med      Multiple Vitamins-Minerals (ZINC PO) Take by mouth every morning, Historical Med      Omega-3 Fatty Acids (FISH OIL) 1200 MG CAPS Take 2 capsules by mouth every morning, Historical Med      rosuvastatin (CRESTOR) 20 MG tablet 20 mg every morning, Starting Wed 4/11/2018, Historical Med      torsemide (DEMADEX) 20 mg tablet Take 20 mg by mouth every morning, Starting Fri 7/26/2019, Historical Med      TURMERIC PO Take by mouth in the morning, Historical Med      valsartan-hydrochlorothiazide (DIOVAN-HCT) 320-12.5 MG per tablet 1 tablet every morning, Starting Wed 10/26/2022, Historical Med           No discharge procedures on file.  ED SEPSIS DOCUMENTATION   Time reflects when diagnosis was documented in both MDM as applicable and the Disposition within this note       Time User Action Codes Description Comment    11/29/2024 11:38 AM Artemio Sterling Add [L03.90] Cellulitis                  Artemio Sterling MD  11/30/24 1124

## 2024-11-29 NOTE — TELEPHONE ENCOUNTER
"Pt calling with LLE redness, edema and pain.  He states that it started a few days ago.  He does not remember injuring the area.  He has 7/10 calf pain. He had left foot surgery 10/2024 and was recently taken off is anticoagulant.  Pt will go to the ED now for evaluation.     Reason for Disposition   Major surgery in past month    Answer Assessment - Initial Assessment Questions  1. ONSET: \"When did the pain start?\"       A few days ago   2. LOCATION: \"Where is the pain located?\"       Left shin   3. PAIN: \"How bad is the pain?\"    (Scale 1-10; or mild, moderate, severe)      7/10  4. WORK OR EXERCISE: \"Has there been any recent work or exercise that involved this part of the body?\"       no  5. CAUSE: \"What do you think is causing the leg pain?\"      I'm not sure   6. OTHER SYMPTOMS: \"Do you have any other symptoms?\" (e.g., chest pain, back pain, breathing difficulty, swelling, rash, fever, numbness, weakness)      Swollen and red  7. PREGNANCY: \"Is there any chance you are pregnant?\" \"When was your last menstrual period?\"      no    Protocols used: Leg Pain-Adult-[place]    "

## 2024-11-30 PROCEDURE — 93971 EXTREMITY STUDY: CPT | Performed by: STUDENT IN AN ORGANIZED HEALTH CARE EDUCATION/TRAINING PROGRAM

## 2024-12-24 ENCOUNTER — OFFICE VISIT (OUTPATIENT)
Dept: FAMILY MEDICINE CLINIC | Facility: CLINIC | Age: 54
End: 2024-12-24
Payer: COMMERCIAL

## 2024-12-24 VITALS
TEMPERATURE: 97.9 F | SYSTOLIC BLOOD PRESSURE: 136 MMHG | RESPIRATION RATE: 16 BRPM | HEART RATE: 102 BPM | BODY MASS INDEX: 38.36 KG/M2 | WEIGHT: 315 LBS | HEIGHT: 76 IN | OXYGEN SATURATION: 97 % | DIASTOLIC BLOOD PRESSURE: 72 MMHG

## 2024-12-24 DIAGNOSIS — E78.2 MIXED HYPERLIPIDEMIA: ICD-10-CM

## 2024-12-24 DIAGNOSIS — N30.00 ACUTE CYSTITIS WITHOUT HEMATURIA: ICD-10-CM

## 2024-12-24 DIAGNOSIS — I10 ESSENTIAL HYPERTENSION: ICD-10-CM

## 2024-12-24 DIAGNOSIS — R35.0 URINARY FREQUENCY: Primary | ICD-10-CM

## 2024-12-24 LAB
SL AMB  POCT GLUCOSE, UA: NORMAL
SL AMB LEUKOCYTE ESTERASE,UA: 75
SL AMB POCT BILIRUBIN,UA: NEGATIVE
SL AMB POCT BLOOD,UA: NEGATIVE
SL AMB POCT CLARITY,UA: ABNORMAL
SL AMB POCT COLOR,UA: YELLOW
SL AMB POCT KETONES,UA: NEGATIVE
SL AMB POCT NITRITE,UA: POSITIVE
SL AMB POCT PH,UA: 5
SL AMB POCT SPECIFIC GRAVITY,UA: 1.01
SL AMB POCT URINE PROTEIN: NEGATIVE
SL AMB POCT UROBILINOGEN: NORMAL

## 2024-12-24 PROCEDURE — 99214 OFFICE O/P EST MOD 30 MIN: CPT | Performed by: STUDENT IN AN ORGANIZED HEALTH CARE EDUCATION/TRAINING PROGRAM

## 2024-12-24 PROCEDURE — 81002 URINALYSIS NONAUTO W/O SCOPE: CPT | Performed by: STUDENT IN AN ORGANIZED HEALTH CARE EDUCATION/TRAINING PROGRAM

## 2024-12-24 RX ORDER — CIPROFLOXACIN 500 MG/1
500 TABLET, FILM COATED ORAL EVERY 12 HOURS SCHEDULED
Qty: 14 TABLET | Refills: 0 | Status: SHIPPED | OUTPATIENT
Start: 2024-12-24 | End: 2024-12-31

## 2024-12-24 NOTE — ASSESSMENT & PLAN NOTE
-/72  -Continue Demadex 20 mg daily and Diovan--12.5 mg daily  -Denies headache, nausea, dizziness and chest pain

## 2024-12-24 NOTE — PROGRESS NOTES
"Name: Pranay Pearson      : 1970      MRN: [id number]  Encounter Provider: Joie Ash MD  Encounter Date: 2024   Encounter department: [facility]  :  Assessment & Plan  Acute cystitis without hematuria    Orders:  •  ciprofloxacin (CIPRO) 500 mg tablet; Take 1 tablet (500 mg total) by mouth every 12 (twelve) hours for 7 days    Urinary frequency    Orders:  •  POCT urine dip  •  Urine culture    Essential hypertension  -/72  -Continue Demadex 20 mg daily and Diovan--12.5 mg daily  -Denies headache, nausea, dizziness and chest pain       Mixed hyperlipidemia  -Continue Crestor 20 mg daily              History of Present Illness     HPI    Patient reports two days ago he had sexual intercourse and immediately after he developed painful ejaculation. He notes shortly after this he had urinary frequency, urgency, and bladder pressure. No blood noted in urine. He has not had a UTI in the past. He notes the painful ejaculation only occurred once. He had a slight fever but this was resolved with tylenol.     Review of Systems   Constitutional:  Negative for activity change, appetite change, chills, fatigue and fever.   HENT:  Negative for congestion.    Respiratory:  Negative for cough, shortness of breath and wheezing.    Cardiovascular:  Negative for chest pain, palpitations and leg swelling.   Gastrointestinal:  Negative for abdominal pain, constipation, diarrhea, nausea and vomiting.   Genitourinary:  Positive for dysuria, frequency and urgency. Negative for hematuria, penile discharge and testicular pain.   Skin:  Negative for rash.   Neurological:  Negative for light-headedness and headaches.   Psychiatric/Behavioral:  The patient is not nervous/anxious.        Objective   /72 (BP Location: Left arm, Patient Position: Sitting, Cuff Size: Large)   Pulse 102   Temp 97.9 °F (36.6 °C) (Temporal)   Resp 16   Ht 6' 4\" (1.93 m)   Wt (!) 163 kg (360 lb)   " SpO2 97%   BMI 43.82 kg/m²      Physical Exam  Constitutional:       Appearance: Normal appearance. He is obese.   HENT:      Head: Normocephalic and atraumatic.   Cardiovascular:      Rate and Rhythm: Normal rate and regular rhythm.      Pulses: Normal pulses.      Heart sounds: Normal heart sounds.   Pulmonary:      Effort: Pulmonary effort is normal.      Breath sounds: Normal breath sounds.   Abdominal:      Tenderness: There is no right CVA tenderness or left CVA tenderness.   Neurological:      General: No focal deficit present.      Mental Status: He is alert and oriented to person, place, and time.   Psychiatric:         Mood and Affect: Mood normal.         Behavior: Behavior normal.         Thought Content: Thought content normal.         Judgment: Judgment normal.

## 2024-12-28 LAB
BACTERIA UR CULT: ABNORMAL
Lab: ABNORMAL
SL AMB ANTIMICROBIAL SUSCEPTIBILITY: ABNORMAL

## 2025-08-08 ENCOUNTER — TELEPHONE (OUTPATIENT)
Age: 55
End: 2025-08-08

## (undated) DEVICE — SUT PDS II 0 CT-2 27 IN Z334H

## (undated) DEVICE — GROUNDING PAD UNIVERSAL SLW

## (undated) DEVICE — GAUZE SPONGES,16 PLY: Brand: CURITY

## (undated) DEVICE — FABRIC REINFORCED, SURGICAL GOWN, XL: Brand: CONVERTORS

## (undated) DEVICE — PACK GENERAL LF

## (undated) DEVICE — GLOVE INDICATOR PI UNDERGLOVE SZ 7.5 BLUE

## (undated) DEVICE — TIBURON LAPAROTOMY DRAPE: Brand: CONVERTORS

## (undated) DEVICE — BASIC DOUBLE BASIN 2-LF: Brand: MEDLINE INDUSTRIES, INC.

## (undated) DEVICE — GLOVE SRG BIOGEL 7

## (undated) DEVICE — SUT VICRYL 2-0 18 IN J905T

## (undated) DEVICE — SUT CHROMIC 0 CT 36 IN 914H

## (undated) DEVICE — VIOLET BRAIDED (POLYGLACTIN 910), SYNTHETIC ABSORBABLE SUTURE: Brand: COATED VICRYL

## (undated) DEVICE — STERI STRIP 1/2 INCH

## (undated) DEVICE — CHLORAPREP HI-LITE 26ML ORANGE

## (undated) DEVICE — SYRINGE 10ML LL CONTROL TOP

## (undated) DEVICE — NEEDLE 25G X 1 1/2

## (undated) DEVICE — SUT CHROMIC 2-0 CT-1 27 IN 811H

## (undated) DEVICE — 3M™ TEGADERM™ TRANSPARENT FILM DRESSING FRAME STYLE, 1626W, 4 IN X 4-3/4 IN (10 CM X 12 CM), 50/CT 4CT/CASE: Brand: 3M™ TEGADERM™

## (undated) DEVICE — LABEL MEDICATION STERILE 2 YELLOW LIDOCAINE 2 BLUE MARCAINE 2 ORANGE HEPARIN